# Patient Record
Sex: MALE | Race: WHITE | URBAN - METROPOLITAN AREA
[De-identification: names, ages, dates, MRNs, and addresses within clinical notes are randomized per-mention and may not be internally consistent; named-entity substitution may affect disease eponyms.]

---

## 2019-11-18 ENCOUNTER — OFFICE VISIT CONVERTED (OUTPATIENT)
Dept: FAMILY MEDICINE CLINIC | Facility: CLINIC | Age: 34
End: 2019-11-18
Attending: NURSE PRACTITIONER

## 2020-06-30 ENCOUNTER — OFFICE VISIT CONVERTED (OUTPATIENT)
Dept: FAMILY MEDICINE CLINIC | Facility: CLINIC | Age: 35
End: 2020-06-30
Attending: NURSE PRACTITIONER

## 2020-06-30 ENCOUNTER — CONVERSION ENCOUNTER (OUTPATIENT)
Dept: FAMILY MEDICINE CLINIC | Facility: CLINIC | Age: 35
End: 2020-06-30

## 2020-06-30 ENCOUNTER — HOSPITAL ENCOUNTER (OUTPATIENT)
Dept: FAMILY MEDICINE CLINIC | Facility: CLINIC | Age: 35
Discharge: HOME | End: 2020-06-30
Attending: NURSE PRACTITIONER

## 2020-07-01 LAB
ALBUMIN SERPL-MCNC: 4.3 G/DL (ref 3.5–5)
ALBUMIN/GLOB SERPL: 1.5 {RATIO} (ref 1.4–2.6)
ALP SERPL-CCNC: 73 U/L (ref 53–128)
ALT SERPL-CCNC: 11 U/L (ref 10–40)
AMYLASE SERPL-CCNC: 68 U/L (ref 30–110)
ANION GAP SERPL CALC-SCNC: 18 MMOL/L (ref 8–19)
AST SERPL-CCNC: 27 U/L (ref 15–50)
BASOPHILS # BLD AUTO: 0.03 10*3/UL (ref 0–0.2)
BASOPHILS NFR BLD AUTO: 0.4 % (ref 0–3)
BILIRUB SERPL-MCNC: 0.25 MG/DL (ref 0.2–1.3)
BUN SERPL-MCNC: 17 MG/DL (ref 5–25)
BUN/CREAT SERPL: 12 {RATIO} (ref 6–20)
CALCIUM SERPL-MCNC: 9.4 MG/DL (ref 8.7–10.4)
CHLORIDE SERPL-SCNC: 99 MMOL/L (ref 99–111)
CHOLEST SERPL-MCNC: 157 MG/DL (ref 107–200)
CHOLEST/HDLC SERPL: 5.4 {RATIO} (ref 3–6)
CONV ABS IMM GRAN: 0.03 10*3/UL (ref 0–0.2)
CONV CO2: 24 MMOL/L (ref 22–32)
CONV IMMATURE GRAN: 0.4 % (ref 0–1.8)
CONV TOTAL PROTEIN: 7.2 G/DL (ref 6.3–8.2)
CREAT UR-MCNC: 1.39 MG/DL (ref 0.7–1.2)
DEPRECATED RDW RBC AUTO: 41.6 FL (ref 35.1–43.9)
EOSINOPHIL # BLD AUTO: 0.5 10*3/UL (ref 0–0.7)
EOSINOPHIL # BLD AUTO: 6.3 % (ref 0–7)
ERYTHROCYTE [DISTWIDTH] IN BLOOD BY AUTOMATED COUNT: 13.3 % (ref 11.6–14.4)
GFR SERPLBLD BASED ON 1.73 SQ M-ARVRAT: >60 ML/MIN/{1.73_M2}
GLOBULIN UR ELPH-MCNC: 2.9 G/DL (ref 2–3.5)
GLUCOSE SERPL-MCNC: 84 MG/DL (ref 70–99)
HCT VFR BLD AUTO: 39.4 % (ref 42–52)
HDLC SERPL-MCNC: 29 MG/DL (ref 40–60)
HGB BLD-MCNC: 12.6 G/DL (ref 14–18)
LDLC SERPL CALC-MCNC: 103 MG/DL (ref 70–100)
LIPASE SERPL-CCNC: 25 U/L (ref 5–51)
LYMPHOCYTES # BLD AUTO: 2.68 10*3/UL (ref 1–5)
LYMPHOCYTES NFR BLD AUTO: 34 % (ref 20–45)
MCH RBC QN AUTO: 27.7 PG (ref 27–31)
MCHC RBC AUTO-ENTMCNC: 32 G/DL (ref 33–37)
MCV RBC AUTO: 86.6 FL (ref 80–96)
MONOCYTES # BLD AUTO: 0.54 10*3/UL (ref 0.2–1.2)
MONOCYTES NFR BLD AUTO: 6.9 % (ref 3–10)
NEUTROPHILS # BLD AUTO: 4.1 10*3/UL (ref 2–8)
NEUTROPHILS NFR BLD AUTO: 52 % (ref 30–85)
NRBC CBCN: 0 % (ref 0–0.7)
OSMOLALITY SERPL CALC.SUM OF ELEC: 283 MOSM/KG (ref 273–304)
PLATELET # BLD AUTO: 325 10*3/UL (ref 130–400)
PMV BLD AUTO: 10.6 FL (ref 9.4–12.4)
POTASSIUM SERPL-SCNC: 4.6 MMOL/L (ref 3.5–5.3)
RBC # BLD AUTO: 4.55 10*6/UL (ref 4.7–6.1)
SODIUM SERPL-SCNC: 136 MMOL/L (ref 135–147)
TRIGL SERPL-MCNC: 123 MG/DL (ref 40–150)
TSH SERPL-ACNC: 1.35 M[IU]/L (ref 0.27–4.2)
VLDLC SERPL-MCNC: 25 MG/DL (ref 5–37)
WBC # BLD AUTO: 7.88 10*3/UL (ref 4.8–10.8)

## 2020-09-08 ENCOUNTER — TELEMEDICINE CONVERTED (OUTPATIENT)
Dept: FAMILY MEDICINE CLINIC | Facility: CLINIC | Age: 35
End: 2020-09-08
Attending: NURSE PRACTITIONER

## 2020-09-09 ENCOUNTER — HOSPITAL ENCOUNTER (OUTPATIENT)
Dept: GENERAL RADIOLOGY | Facility: HOSPITAL | Age: 35
Discharge: HOME | End: 2020-09-09
Attending: NURSE PRACTITIONER

## 2020-09-09 LAB
ALBUMIN SERPL-MCNC: 4.1 G/DL (ref 3.5–5)
ALBUMIN/GLOB SERPL: 1.4 {RATIO} (ref 1.4–2.6)
ALP SERPL-CCNC: 76 U/L (ref 53–128)
ALT SERPL-CCNC: 12 U/L (ref 10–40)
ANION GAP SERPL CALC-SCNC: 16 MMOL/L (ref 8–19)
AST SERPL-CCNC: 21 U/L (ref 15–50)
BASOPHILS # BLD AUTO: 0.03 10*3/UL (ref 0–0.2)
BASOPHILS NFR BLD AUTO: 0.3 % (ref 0–3)
BILIRUB SERPL-MCNC: 0.22 MG/DL (ref 0.2–1.3)
BUN SERPL-MCNC: 16 MG/DL (ref 5–25)
BUN/CREAT SERPL: 13 {RATIO} (ref 6–20)
CALCIUM SERPL-MCNC: 9 MG/DL (ref 8.7–10.4)
CHLORIDE SERPL-SCNC: 101 MMOL/L (ref 99–111)
CONV ABS IMM GRAN: 0.02 10*3/UL (ref 0–0.2)
CONV CO2: 26 MMOL/L (ref 22–32)
CONV IMMATURE GRAN: 0.2 % (ref 0–1.8)
CONV TOTAL PROTEIN: 7.1 G/DL (ref 6.3–8.2)
CREAT UR-MCNC: 1.2 MG/DL (ref 0.7–1.2)
DEPRECATED RDW RBC AUTO: 43.9 FL (ref 35.1–43.9)
EOSINOPHIL # BLD AUTO: 0.51 10*3/UL (ref 0–0.7)
EOSINOPHIL # BLD AUTO: 5.5 % (ref 0–7)
ERYTHROCYTE [DISTWIDTH] IN BLOOD BY AUTOMATED COUNT: 13.5 % (ref 11.6–14.4)
FOLATE SERPL-MCNC: 12.2 NG/ML (ref 4.8–20)
GFR SERPLBLD BASED ON 1.73 SQ M-ARVRAT: >60 ML/MIN/{1.73_M2}
GLOBULIN UR ELPH-MCNC: 3 G/DL (ref 2–3.5)
GLUCOSE SERPL-MCNC: 75 MG/DL (ref 70–99)
HCT VFR BLD AUTO: 38.3 % (ref 42–52)
HGB BLD-MCNC: 11.9 G/DL (ref 14–18)
IRON SATN MFR SERPL: 10 % (ref 20–55)
IRON SERPL-MCNC: 36 UG/DL (ref 70–180)
LYMPHOCYTES # BLD AUTO: 2.37 10*3/UL (ref 1–5)
LYMPHOCYTES NFR BLD AUTO: 25.6 % (ref 20–45)
MCH RBC QN AUTO: 27.5 PG (ref 27–31)
MCHC RBC AUTO-ENTMCNC: 31.1 G/DL (ref 33–37)
MCV RBC AUTO: 88.5 FL (ref 80–96)
MONOCYTES # BLD AUTO: 0.72 10*3/UL (ref 0.2–1.2)
MONOCYTES NFR BLD AUTO: 7.8 % (ref 3–10)
NEUTROPHILS # BLD AUTO: 5.61 10*3/UL (ref 2–8)
NEUTROPHILS NFR BLD AUTO: 60.6 % (ref 30–85)
NRBC CBCN: 0 % (ref 0–0.7)
OSMOLALITY SERPL CALC.SUM OF ELEC: 288 MOSM/KG (ref 273–304)
PLATELET # BLD AUTO: 286 10*3/UL (ref 130–400)
PMV BLD AUTO: 10.7 FL (ref 9.4–12.4)
POTASSIUM SERPL-SCNC: 4.2 MMOL/L (ref 3.5–5.3)
RBC # BLD AUTO: 4.33 10*6/UL (ref 4.7–6.1)
SODIUM SERPL-SCNC: 139 MMOL/L (ref 135–147)
TIBC SERPL-MCNC: 365 UG/DL (ref 245–450)
TRANSFERRIN SERPL-MCNC: 255 MG/DL (ref 215–365)
VIT B12 SERPL-MCNC: 697 PG/ML (ref 211–911)
WBC # BLD AUTO: 9.26 10*3/UL (ref 4.8–10.8)

## 2020-09-10 ENCOUNTER — HOSPITAL ENCOUNTER (OUTPATIENT)
Dept: FAMILY MEDICINE CLINIC | Facility: CLINIC | Age: 35
Discharge: HOME | End: 2020-09-10
Attending: NURSE PRACTITIONER

## 2020-09-10 ENCOUNTER — OFFICE VISIT CONVERTED (OUTPATIENT)
Dept: FAMILY MEDICINE CLINIC | Facility: CLINIC | Age: 35
End: 2020-09-10
Attending: NURSE PRACTITIONER

## 2020-09-10 LAB
APPEARANCE UR: CLEAR
BILIRUB UR QL: NEGATIVE
COLOR UR: YELLOW
CONV BACTERIA: NEGATIVE
CONV COLLECTION SOURCE (UA): ABNORMAL
CONV UROBILINOGEN IN URINE BY AUTOMATED TEST STRIP: 1 {EHRLICHU}/DL (ref 0.1–1)
GLUCOSE UR QL: NEGATIVE MG/DL
HGB UR QL STRIP: NEGATIVE
KETONES UR QL STRIP: NEGATIVE MG/DL
LEUKOCYTE ESTERASE UR QL STRIP: NEGATIVE
NITRITE UR QL STRIP: NEGATIVE
PH UR STRIP.AUTO: 5.5 [PH] (ref 5–8)
PROT UR QL: 30 MG/DL
RBC #/AREA URNS HPF: ABNORMAL /[HPF]
SP GR UR: 1.02 (ref 1–1.03)
WBC #/AREA URNS HPF: ABNORMAL /[HPF]

## 2020-09-12 LAB — BACTERIA UR CULT: NORMAL

## 2020-09-15 ENCOUNTER — OFFICE VISIT CONVERTED (OUTPATIENT)
Dept: FAMILY MEDICINE CLINIC | Facility: CLINIC | Age: 35
End: 2020-09-15
Attending: NURSE PRACTITIONER

## 2020-09-15 LAB
C TRACH RRNA CVX QL NAA+PROBE: NEGATIVE
N GONORRHOEA DNA SPEC QL NAA+PROBE: NEGATIVE

## 2021-05-13 NOTE — PROGRESS NOTES
"   Progress Note      Patient Name: Joaquim Ugarte   Patient ID: [id number]   Sex: Male   YOB: 1985        Visit Date: June 30, 2020    Provider: RONALD Lugo   Location: [facility]   Location Address: [address] [place], [place] [address]RY[place] Mack[place]  [id number]   Location Phone: [phone]          Chief Complaint     follow up refills         History Of Present Illness  Joaquim Ugarte is a 35 year old male who presents for evaluation and treatment of:      pt asking for creon and zofran.    hx of pancreatitis with multiple ERCPs for bile duct obstruction. He uses Creon. He was admitted to the hospital in Jan for elevated LFTs but they did not say it was actually pancreatitis. Last pancreatitis was in 2017, these started in 2009. He is disabled because of it. He sees gastro at [facility], but hasn't seen them in a few years. He says he was having such pain that he started using street drugs to cope.  He's been doing recently.    GERD, would like to try protonix. He has nausea intermittently, wants zofran refilled.     drug addiction, he says he is on and off methadone. He is on 80mg of methadone now. He admits to \"some setbacks.\"    hx of alcoholism, he says he has had some set backs    He wakes at night gasping for air. He doesn't snore. His mom has sleep apnea. He does not want to do a test.    smokes 1ppd    sober from alcohol since 2009  sober from drugs for 4 months, \"but I've had an occ. set back\"       Past Medical History  Disease Name Date Onset Notes   Allergic rhinitis --  --    Anxiety --  --    Chemical dependency --  --    Chronic pancreatitis --  --    Colitis --  --    Congestive Heart Failure --  --    COPD (chronic obstructive pulmonary disease) --  --    Depression (emotion) --  --    Diverticulitis --  --    Emphysema --  --    IBS (irritable bowel syndrome) --  --    Night sweats --  --    Reflux Disease --  --    Sinus trouble --  --          Past Surgical History  Procedure " Name Date Notes   Colonoscopy in adult 2011 --          Medication List  Name Date Started Instructions   Claritin 10 mg oral tablet 11/18/2019 take 1 tablet (10 mg) by oral route once daily   Creon oral  take 1 by oral route daily   Flonase Allergy Relief 50 mcg/actuation nasal spray,suspension 11/18/2019 inhale 2 sprays (100 mcg) in each nostril by intranasal route once daily as needed   gabapentin 600 mg oral tablet  take 1 tablet (600 mg) by oral route 3 times per day   hydroxyzine HCl 25 mg oral tablet  --    Imitrex 50 mg oral tablet  --    methadone oral  take 1 by oral route daily   nortriptyline 10 mg oral capsule 11/18/2019 take 1 capsule (10 mg) by oral route once daily at bedtime   oxycodone 10 mg oral tablet  --    promethazine 25 mg oral tablet  --    Zofran 8 mg oral tablet 11/18/2019 take 1 tablet by oral route 2 times a day as needed         Allergy List  Allergen Name Date Reaction Notes   PENICILLINS --  --  --        Allergies Reconciled  Family Medical History  Disease Name Relative/Age Notes   Cervical Neoplasm, Malignant  --    Stroke  --    Heart Disease  --    Cancer, Unspecified  tonsil cancer- father brain cancer- GM   Thyroid disorder Sister/   thyroid cancer   Diabetes  --    Kidney Cancer  --          Social History  Finding Status Start/Stop Quantity Notes   Tobacco Current every day --/-- 1ppd --          Immunizations  NameDate Admin Mfg Trade Name Lot Number Route Inj VIS Given VIS Publication   [id number]/18/2019 [facility] Fluzone Quadrivalent [id number] IM RD 11/18/2019    Comments: Pt tolerated well, left office in stable condition         Review of Systems  · Constitutional  o Admits  o : fatigue  · HENT  o Admits  o : nasal congestion  o Denies  o : headaches, sinus pain  · Cardiovascular  o Denies  o : lower extremity edema, chest pressure, palpitations  · Respiratory  o Denies  o : shortness of breath, wheezing, cough, dyspnea on exertion  · Gastrointestinal  o Admits  o : reflux,  nausea, diarrhea  o Denies  o : vomiting, constipation, abdominal pain  · Musculoskeletal  o Denies  o : joint pain  · Psychiatric  o Admits  o : anxiety, depression  o Denies  o : suicidal ideation, homicidal ideation      Vitals  Date Time BP Position Site L\R Cuff Size HR RR TEMP (F) WT  HT  BMI kg/m2 BSA m2 O2 Sat HC       06/30/2020 11:07 /77 Sitting    85 - R 18 98 153lbs 1oz 6'   20.76 1.88 97 %          Physical Examination  · Constitutional  o Appearance  o : well developed,thin appearing, no acute distress  · Neck  o Inspection/Palpation  o : normal appearance, no masses or tenderness, trachea midline  o Thyroid  o : gland size normal, nontender, no nodules or masses present on palpation  · Respiratory  o Respiratory Effort  o : breathing unlabored  o Inspection of Chest  o : chest rise symmetric bilaterally  o Auscultation of Lungs  o : clear to auscultation bilaterally throughout inspiration and expiration  · Cardiovascular  o Heart  o :   § Auscultation of Heart  § : regular rate and rhythm, no murmurs, gallops or rubs  o Peripheral Vascular System  o :   § Extremities  § : no edema  · Lymphatic  o Neck  o : no cervical lymphadenopathy, no supraclavicular lymphadenopathy  · Psychiatric  o Mood and Affect  o : mood normal, inappropriate      he slurs his words, is wearing sunglasses in the exam room, smells strongly of tobacco smoke and cologne.               Assessment  · Allergic rhinitis due to allergen     477.9/J30.9  · GERD (gastroesophageal reflux disease)     530.81/K21.9  · Nicotine dependence     305.1/F17.200  · Screening for lipid disorders     V77.91/Z13.220  · Methadone maintenance therapy patient     304.00/F11.20  · History of drug dependence/abuse     304.63/F19.21  · Chronic pancreatitis     577.1/K86.1  · History of colitis     V12.79/Z87.19      Plan  · Orders  o Physical, Primary Care Panel (CBC, CMP, Lipid, TSH) [facility] ([id number], 62045, 26047, 04131) - - 06/30/2020  o ACO-39:  Current medications updated and reviewed () - - 06/30/2020  o ACO-14: Influenza immunization administered or previously received () - - 06/30/2020  o ALIDA Report (KASPR) - - 06/30/2020  o Amylase/Lipase Profile (ALPN) - - 06/30/2020  · Medications  o Protonix 40 mg oral tablet,delayed release (DR/EC)   SIG: take 1 tablet (40 mg) by oral route once daily   DISP: (30) tablets with 5 refills  Prescribed on 06/30/2020     o Creon 36,000-114,000- 180,000 unit oral capsule,delayed release(DR/EC)   SIG: take 2 capsules by oral route 3 times per day with meals and 1 capsule with each snack swallowing whole. Do not crush, chew and/or divide.   DISP: (300) capsules with 0 refills  Prescribed on 06/30/2020     o Zofran 8 mg oral tablet   SIG: take 1 tablet by oral route 2 times a day as needed   DISP: (20) tablets with 1 refills  Refilled on 06/30/2020     o Medications have been Reconciled  o Transition of Care or Provider Policy  · Instructions  o Maintain a healthy weight. Avoid tight fitting clothes. Avoid fried, fatty foods, tomato sauce, chocolate, mint, garlic, onion, alcohol. caffeine. Eat smaller meals, dont lie down after a meal, dont smoke. Elevate the head of your bed 6-9 inches.  o *Form of nicotine being used: cigarettes  o Take all medications as prescribed/directed.  o Patient was educated/instructed on their diagnosis, treatment and medications prior to discharge from the clinic today.  o Patient instructed to seek medical attention urgently for new or worsening symptoms.  o Call the office with any concerns or questions.  o Minutes spent with patient including greater than 50% in Education/Counseling/Care Coordination.  o Time spent with the patient was minutes, more than 50% face to face.  o Risks, benefits, and alternatives were discussed with the patient. The patient is aware of risks associated with:  o Chronic conditions reviewed and taken into consideration for today's treatment plan.  o i  agreed to fill his creon for one refill, but insisted he f/u with gastro. I called the pharmacy and he's not had this filled since Nov. 2018. He agrees that he will call them.   · Disposition  o Call or Return if symptoms worsen or persist.  o F/u appt in 3 months            Electronically Signed by: RONALD Lugo -Author on June 30, 2020 12:02:35 PM

## 2021-05-13 NOTE — PROGRESS NOTES
Quick Note      Patient Name: Joaquim Ugarte   Patient ID: [id number]   Sex: Male   YOB: 1985        Visit Date: September 8, 2020    Provider: RONALD Lugo   Location: [facility]   Location Address: [address] [place] [address] RYNE Rosales  [id number]   Location Phone: [phone]          History Of Present Illness  TELEHEALTH TELEPHONE VISIT  Joaquim Ugarte is a 35 year old male who is presenting for evaluation via telehealth telephone visit. Verbal consent obtained before beginning visit. Patient alone on call.   Provider spent 11 minutes with patient during telehealth visit.   The following staff were present during this visit: AT/CMA   Past Medical History/Overview of Patient Symptoms  Joaquim Ugarte is a 35 year old male who presents for evaluation and treatment of:      Right Groin area lymph nodes swollen- golf ball size X5 days   Right inner elbow is swollen- golf ball size X1 week    he is willing to come in for an appt. He feels ok, denies fever, they are tender.     last visit showed anemia, he did not return for the f/u tests.     He is willing to have labs done prior to his appt.    Last creatinine was elevated.           Assessment  · Anemia     285.9/D64.9  · Swollen lymph nodes     785.6/R59.9  · Elevated laboratory test result     796.4/R89.9      Plan  · Orders  o B12 Folate levels (B12FO) - 285.9/D64.9 - 09/08/2020  o CBC with Auto Diff [facility] ([id number]) - 785.6/R59.9 - 09/08/2020  o CMP [facility] ([id number]) - 796.4/R89.9 - 09/08/2020  o ACO-39: Current medications updated and reviewed () - - 09/08/2020  o ACO-14: Influenza immunization administered or previously received () - - 09/08/2020  o Physician Telephone Evaluation, 11-20 minutes ([id number]) - - 09/08/2020  o Urinalysis with Reflex Microscopy if abnormal ([facility]) ([id number]) - 785.6/R59.9 - 09/08/2020  o Urine Culture [facility] ([id number]) - 785.6/R59.9 - 09/08/2020  o GC/Chlamydia by PCR (Urine or Vaginal Swab)  [facility] ([id number]) - 785.6/R59.9 - 09/08/2020  · Medications  o Medications have been Reconciled  o Transition of Care or Provider Policy  · Instructions  o Plan Of Care: he is going to have labs prior to the appt, he may need a hematology consult.   o Chronic conditions reviewed and taken into consideration for today's treatment plan.  o Call the office with any concerns or questions.  o Minutes spent with patient including greater than 50% in Education/Counseling/Care Coordination.  o Time spent with the patient was minutes, more than 50% face to face.  o he will come thursday for a same day appt so I can eval face to face  · Disposition  o Call or Return if symptoms worsen or persist.            Electronically Signed by: RONALD Lugo -Author on September 8, 2020 04:01:04 PM

## 2021-05-13 NOTE — PROGRESS NOTES
Progress Note      Patient Name: Joaquim Ugarte   Patient ID: [id number]   Sex: Male   YOB: 1985        Visit Date: September 15, 2020    Provider: RONALD Lugo   Location: [facility]   Location Address: [address] [place] [address] R[place]NE Rosales  [id number]   Location Phone: [phone]          Chief Complaint  · Rash on bi lat arms, face, head, buttocks  · Left ear pain      History Of Present Illness  Past Medical History/Overview of Patient Symptoms  Joaquim Uagrte is a 35 year old male who presents for evaluation and treatment of:      f/u on swollen lymph nodes, epitrochlear and right groin. WBC was normal, he is anemic. He needs labs today to check for mono, HIV, RPR. He reports that the swelling is worse at the elbow and now he has one under the left jaw and a few in his scalp that are new. He is on keflex currently.     Hx of IV drug use, he says he is not using any now. He reports he had a HIV test last year at the methadone clinic that was negative. He is still using methadone.  He is wearing glasses during the visit again today.  He failed UDS at last visit.            Past Medical History  Disease Name Date Onset Notes   Allergic rhinitis --  --    Anxiety --  --    Chemical dependency --  --    Chronic pancreatitis --  --    Colitis --  --    Congestive Heart Failure --  --    COPD (chronic obstructive pulmonary disease) --  --    Depression (emotion) --  --    Diverticulitis --  --    Emphysema --  --    IBS (irritable bowel syndrome) --  --    Night sweats --  --    Reflux Disease --  --    Sinus trouble --  --          Past Surgical History  Procedure Name Date Notes   Colonoscopy in adult 2011 --          Medication List  Name Date Started Instructions   Claritin 10 mg oral tablet 11/18/2019 take 1 tablet (10 mg) by oral route once daily   Creon oral  take 1 by oral route daily   Creon 36,000-114,000- 180,000 unit oral capsule,delayed release(/EC)  09/15/2020 take 2 capsules by oral route 3 times per day with meals and 1 capsule with each snack swallowing whole. Do not crush, chew and/or divide.   Flonase Allergy Relief 50 mcg/actuation nasal spray,suspension 11/18/2019 inhale 2 sprays (100 mcg) in each nostril by intranasal route once daily as needed   hydroxyzine HCl 25 mg oral tablet  --    Iron (ferrous sulfate) 325 mg (65 mg iron) oral tablet 09/10/2020 take 1 tablet (325 mg) by oral route once daily   Keflex 500 mg oral capsule 09/10/2020 take 1 capsule by oral route 3 times a day for 10 days   methadone oral  take 1 by oral route daily   Protonix 40 mg oral tablet,delayed release (DR/EC) 06/30/2020 take 1 tablet (40 mg) by oral route once daily   Zofran 8 mg oral tablet 06/30/2020 take 1 tablet by oral route 2 times a day as needed         Allergy List  Allergen Name Date Reaction Notes   Latex --  --  --    PENICILLINS --  --  --        Allergies Reconciled  Family Medical History  Disease Name Relative/Age Notes   Cervical Neoplasm, Malignant  --    Stroke  --    Heart Disease  --    Cancer, Unspecified  tonsil cancer- father brain cancer- GM   Thyroid disorder Sister/   thyroid cancer   Diabetes  --    Kidney Cancer  --          Social History  Finding Status Start/Stop Quantity Notes   Tobacco Current every day --/-- 1ppd --          Immunizations  NameDate Admin Mfg Trade Name Lot Number Route Inj VIS Given VIS Publication   [id number]/18/2019 [facility] Fluzone Quadrivalent [id number] IM RD 11/18/2019    Comments: Pt tolerated well, left office in stable condition         Review of Systems  · Constitutional  o Admits  o : fatigue  o Denies  o : fever, night sweats  · HENT  o Denies  o : vertigo, lightheadedness, recent head injury  · Cardiovascular  o Denies  o : chest pain, irregular heart beats, rapid heart rate, dyspnea on exertion, lower extremity edema  · Respiratory  o Denies  o : shortness of breath, wheezing, cough, productive  "cough  · Gastrointestinal  o Denies  o : nausea, vomiting, diarrhea, dysphagia, heartburn  · Genitourinary  o Denies  o : dysuria, urinary retention  · Neurologic  o Denies  o : altered mental status, seizures  · Psychiatric  o Denies  o : anxiety, depression  · Heme-Lymph  o Admits  o : lymph node enlargement or tenderness  o Denies  o : easy bleeding, easy bruising, petechiae  · Allergic-Immunologic  o Denies  o : frequent illnesses      Vitals  Date Time BP Position Site L\R Cuff Size HR RR TEMP (F) WT  HT  BMI kg/m2 BSA m2 O2 Sat HC       09/15/2020 03:31 /80 Sitting    100 - R 16 98.9 153lbs 3oz 6'   20.78 1.88 97 %          Physical Examination  · Constitutional  o Appearance  o : well developed, well-nourished, no acute distress  · Respiratory  o Respiratory Effort  o : breathing unlabored  o Auscultation of Lungs  o : clear to auscultation bilaterally throughout inspiration and expiration  · Lymphatic  o Neck  o : no cervical lymphadenopathy, no supraclavicular lymphadenopathy  o Axilla  o : no lymphadenopathy  o Groin  o : pt did not let me do the exam  o Epitrochlear Nodes  o : right epitrochlear lymphnode swollen kaela. 1.5 cm in diameter, tender  o Supraclavicular Nodes  o : none evident  o Additional Nodes  o : left submandibular lymph node tender  · Psychiatric  o Mood and Affect  o : at first, his mood was normal, but when I talked to him about changing abx he became mad and beligerant.      when discussing the plan, he gets beligerant that he just wants a steroid and if I can't see that, he will go find a doctor who can treat him because he doens't usually see anyone but doctors because \"Nurse Practitioners dont have a brain.\"    He stated he was going to the ER where they would treat him and he stormed out with out having labs done today or any further treatment.               Assessment  · Anemia     285.9/D64.9  · Epitrochlear lymphadenopathy     785.6/R59.0  · Inguinal " lymphadenopathy     785.6/R59.0  · Iron deficiency anemia     280.9/D50.9  · History of recreational drug use     304.90/Z87.898      Plan  · Orders  o ACO-39: Current medications updated and reviewed () - - 09/15/2020  · Medications  o Medications have been Reconciled  o Transition of Care or Provider Policy  · Instructions  o Chronic conditions reviewed and taken into consideration for today's treatment plan.  o Call the office with any concerns or questions.  o Minutes spent with patient including greater than 50% in Education/Counseling/Care Coordination.  o Time spent with the patient was minutes, more than 50% face to face.  o I am concerned on multiple levels for this pt and I explained this to him before he became agitated and left. I recommended an u/s on the areas of concern and he refuses. He refuses changing antibiotics. I also offered to give a round of steroids with the antibiotics, which is what he was requesting, and he does not answer the offer. He left very irrate. He would not speak with my . He refused any labs today  · Disposition  o Call or Return if symptoms worsen or persist.            Electronically Signed by: RONALD Lugo -Author on September 15, 2020 05:14:01 PM

## 2021-05-13 NOTE — PROGRESS NOTES
Progress Note      Patient Name: Joaquim Ugarte   Patient ID: [id number]   Sex: Male   YOB: 1985        Visit Date: September 10, 2020    Provider: RONALD Lugo   Location: [facility]   Location Address: [address] [place] [address] RYNE Rosales  [id number]   Location Phone: [phone]          Chief Complaint  · Discuss labs  · Medication refill  · Gastro referral       History Of Present Illness  Past Medical History/Overview of Patient Symptoms  Joaquim Ugarte is a 35 year old male who presents for evaluation and treatment of:      swollen, tender lymph nodes, right inner forearm near the elbow and right groin.  They have been inflamed for a week or more. He describes them as the size of a golf ball. He has labs drawn yesterday in preparation for the visit today.     He has been fatigued and drinking lots of energy drinks. Denies fever. No urinary issues, no trauma, no cat scratches.     anemia, he did not return for the f/u tests but had labs drawn yesterday. His iron was low and hgb was 11.9.     Last creatinine was elevated but normal on labs today.     Hx of IV drug use, he says he is not using any now. He reports he had a HIV test last year at the methadone clinic that was negative. He is still using methadone.  we are waiting on the HIV test from yesterday.     hx of chronic pancreatitis, he needs refills on the Creon. He wants a referral to get back in with his gastro.  He hasn't had any issues recently       Past Medical History  Disease Name Date Onset Notes   Allergic rhinitis --  --    Anxiety --  --    Chemical dependency --  --    Chronic pancreatitis --  --    Colitis --  --    Congestive Heart Failure --  --    COPD (chronic obstructive pulmonary disease) --  --    Depression (emotion) --  --    Diverticulitis --  --    Emphysema --  --    IBS (irritable bowel syndrome) --  --    Night sweats --  --    Reflux Disease --  --    Sinus trouble --  --           Past Surgical History  Procedure Name Date Notes   Colonoscopy in adult 2011 --          Medication List  Name Date Started Instructions   Claritin 10 mg oral tablet 11/18/2019 take 1 tablet (10 mg) by oral route once daily   Creon oral  take 1 by oral route daily   Creon 36,000-114,000- 180,000 unit oral capsule,delayed release(DR/EC) 09/15/2020 take 2 capsules by oral route 3 times per day with meals and 1 capsule with each snack swallowing whole. Do not crush, chew and/or divide.   Flonase Allergy Relief 50 mcg/actuation nasal spray,suspension 11/18/2019 inhale 2 sprays (100 mcg) in each nostril by intranasal route once daily as needed   hydroxyzine HCl 25 mg oral tablet  --    Iron (ferrous sulfate) 325 mg (65 mg iron) oral tablet 09/10/2020 take 1 tablet (325 mg) by oral route once daily   Keflex 500 mg oral capsule 09/10/2020 take 1 capsule by oral route 3 times a day for 10 days   methadone oral  take 1 by oral route daily   Protonix 40 mg oral tablet,delayed release (DR/EC) 06/30/2020 take 1 tablet (40 mg) by oral route once daily   Zofran 8 mg oral tablet 06/30/2020 take 1 tablet by oral route 2 times a day as needed         Allergy List  Allergen Name Date Reaction Notes   Latex --  --  --    PENICILLINS --  --  --          Family Medical History  Disease Name Relative/Age Notes   Cervical Neoplasm, Malignant  --    Stroke  --    Heart Disease  --    Cancer, Unspecified  tonsil cancer- father brain cancer- GM   Thyroid disorder Sister/   thyroid cancer   Diabetes  --    Kidney Cancer  --          Social History  Finding Status Start/Stop Quantity Notes   Tobacco Current every day --/-- 1ppd --          Immunizations  NameDate Admin Mfg Trade Name Lot Number Route Inj VIS Given VIS Publication   Tmczwisvy03/18/2019 [facility] Fluzone Quadrivalent [id number] IM RD 11/18/2019    Comments: Pt tolerated well, left office in stable condition         Review of Systems  · Constitutional  o Admits  o :  fatigue  o Denies  o : fever, night sweats  · HENT  o Denies  o : vertigo, lightheadedness, recent head injury  · Cardiovascular  o Denies  o : chest pain, irregular heart beats, rapid heart rate, dyspnea on exertion, lower extremity edema  · Respiratory  o Denies  o : shortness of breath, wheezing, cough, productive cough  · Gastrointestinal  o Denies  o : nausea, vomiting, diarrhea, dysphagia, heartburn  · Psychiatric  o Denies  o : anxiety, depression  · Heme-Lymph  o Admits  o : lymph node enlargement or tenderness  o Denies  o : easy bleeding, easy bruising, petechiae      Vitals  Date Time BP Position Site L\R Cuff Size HR RR TEMP (F) WT  HT  BMI kg/m2 BSA m2 O2 Sat        09/10/2020 11:21 /77 Sitting    75 - R 16 98.5 149lbs 5oz 6'   20.25 1.85 97 %          Physical Examination  · Constitutional  o Appearance  o : well developed, well-nourished, no acute distress  · Head and Face  o Head  o : normocephalic, atraumatic  · Respiratory  o Respiratory Effort  o : breathing unlabored  o Auscultation of Lungs  o : clear to auscultation bilaterally throughout inspiration and expiration  · Cardiovascular  o Heart  o : regular rate and rhythm  o Peripheral Vascular System  o : no peripheral edema  · Lymphatic  o Neck  o : no cervical lymphadenopathy, no supraclavicular lymphadenopathy  o Axilla  o : no lymphadenopathy  o Groin  o : right inguinal lymph node swollen and tender appr. 1cm in diameter  o Epitrochlear Nodes  o : right epitrochlear lymphnode swollen kaela. 1 cm in diameter, tender  o Supraclavicular Nodes  o : none evident  · Psychiatric  o Mood and Affect  o : mood normal, affect appropriate     he is wearing sunglasses during most of the exam.           Results  · In-Office Procedures  o Lab procedure  § IOP - Urine Drug Screen In-House [facility] ([address]10)   § Amphetamines Ur Ql: Negative   § Barbiturates Ur Ql: Negative   § Buprenorphine+Nor Ur Ql Scn: Negative   § Benzodiaz Ur Ql: Negative   § Cocaine Ur  Ql: Negative   § Methadone Ur Ql: Positive   § Methamphet Ur Ql: Negative   § MDMA Ur Ql Scn: Negative   § Opiates Ur Ql: Positive   § Oxycodone Ur Ql: Negative   § PCP Ur Ql: Negative   § THC Ur Ql: Positive   § Temp in Range?: Within/Acceptable   § Control Seen?: Yes   § IOP - Urinalysis without Microscopy (Clinitek) [facility] ([id number])   § Color Ur: Yellow   § Clarity Ur: Clear   § Glucose Ur Ql Strip: Negative   § Bilirub Ur Ql Strip: Negative   § Ketones Ur Ql Strip: Negative   § Sp Gr Ur Qn: greater than 1.030   § Hgb Ur Ql Strip: Trace-Intact   § pH Ur-LsCnc: 6.0   § Prot Ur Ql Strip: 30 mg/dL   § Urobilinogen Ur Strip-mCnc: 0.2 E.U./dL   § Nitrite Ur Ql Strip: Negative   § WBC Est Ur Ql Strip: Negative       Assessment  · Anemia     285.9/D64.9  · Epitrochlear lymphadenopathy     785.6/R59.0  · Inguinal lymphadenopathy     785.6/R59.0  · Iron deficiency anemia     280.9/D50.9  · History of recreational drug use     304.90/Z87.898  · Chronic pancreatitis     577.1/K86.1  · Positive urine drug screen     796.0/R82.5      Plan  · Orders  o CBC with Auto Diff [facility] ([id number]) - - 10/07/2020   [facility]  o ACO-39: Current medications updated and reviewed () - - 09/10/2020  o ACO-14: Influenza immunization was not administered for reasons documented () - - 09/15/2020  o Gastroenterology Consultation (GASTR) - 577.1/K86.1, 285.9/D64.9 - 09/10/2020   pt est with dr brianne baron. needs updated referral and appt please.   o Iron Profile (Iron 17349 TIBC 33478 and Transferrin 33260) (IRONP) - - 10/07/2020  o EBV antibody panel (29942, 47694, 83542) - - 10/07/2020  o HIV Screen by EIA [facility] (60721, ) - - 09/15/2020  o RPR Treponema pallidum antibody [facility] (22844) - - 09/15/2020  · Medications  o Medications have been Reconciled  o Transition of Care or Provider Policy  · Instructions  o Chronic conditions reviewed and taken into consideration for today's treatment plan.  o Call the office with any  concerns or questions.  o Minutes spent with patient including greater than 50% in Education/Counseling/Care Coordination.  o Time spent with the patient was minutes, more than 50% face to face.  o I called in refills on the creon and the keflex to the pharmacy. Will await urine tests for std and HIV test. I am suspicious for lymphoma or HIV. will await those results before referring to hematology/oncology for futher work up   o referral to gastro for the f/u on pancreatitis  o since he was not requesting controlled meds today, I did not discuss with him the positive UDS.   · Disposition  o Call or Return if symptoms worsen or persist.  o F/U appt in 1 month            Electronically Signed by: RONALD Lugo -Author on September 15, 2020 03:42:04 PM   45

## 2021-05-14 VITALS
BODY MASS INDEX: 20.22 KG/M2 | DIASTOLIC BLOOD PRESSURE: 77 MMHG | OXYGEN SATURATION: 97 % | WEIGHT: 149.31 LBS | HEART RATE: 75 BPM | RESPIRATION RATE: 16 BRPM | TEMPERATURE: 98.5 F | SYSTOLIC BLOOD PRESSURE: 128 MMHG | HEIGHT: 72 IN

## 2021-05-14 VITALS
HEART RATE: 100 BPM | WEIGHT: 153.19 LBS | RESPIRATION RATE: 16 BRPM | SYSTOLIC BLOOD PRESSURE: 129 MMHG | HEIGHT: 72 IN | DIASTOLIC BLOOD PRESSURE: 80 MMHG | TEMPERATURE: 98.9 F | OXYGEN SATURATION: 97 % | BODY MASS INDEX: 20.75 KG/M2

## 2021-05-15 VITALS
BODY MASS INDEX: 20.73 KG/M2 | TEMPERATURE: 98 F | WEIGHT: 153.06 LBS | DIASTOLIC BLOOD PRESSURE: 77 MMHG | OXYGEN SATURATION: 97 % | RESPIRATION RATE: 18 BRPM | SYSTOLIC BLOOD PRESSURE: 127 MMHG | HEART RATE: 85 BPM | HEIGHT: 72 IN

## 2021-05-15 VITALS
SYSTOLIC BLOOD PRESSURE: 142 MMHG | WEIGHT: 143.19 LBS | HEART RATE: 85 BPM | OXYGEN SATURATION: 99 % | HEIGHT: 72 IN | DIASTOLIC BLOOD PRESSURE: 87 MMHG | BODY MASS INDEX: 19.39 KG/M2 | RESPIRATION RATE: 16 BRPM

## 2023-05-21 ENCOUNTER — HOSPITAL ENCOUNTER (EMERGENCY)
Facility: HOSPITAL | Age: 38
Discharge: HOME | End: 2023-05-21
Attending: EMERGENCY MEDICINE
Payer: MEDICAID

## 2023-05-21 VITALS
WEIGHT: 150 LBS | OXYGEN SATURATION: 100 % | RESPIRATION RATE: 18 BRPM | HEART RATE: 86 BPM | BODY MASS INDEX: 20.32 KG/M2 | HEIGHT: 72 IN | DIASTOLIC BLOOD PRESSURE: 75 MMHG | TEMPERATURE: 98.7 F | SYSTOLIC BLOOD PRESSURE: 126 MMHG

## 2023-05-21 DIAGNOSIS — R22.0 RIGHT FACIAL SWELLING: ICD-10-CM

## 2023-05-21 DIAGNOSIS — K04.7 DENTAL ABSCESS: Primary | ICD-10-CM

## 2023-05-21 DIAGNOSIS — K08.89 PAIN, DENTAL: ICD-10-CM

## 2023-05-21 PROCEDURE — 25010000002 KETOROLAC TROMETHAMINE PER 15 MG: Performed by: NURSE PRACTITIONER

## 2023-05-21 PROCEDURE — 96372 THER/PROPH/DIAG INJ SC/IM: CPT

## 2023-05-21 PROCEDURE — 99283 EMERGENCY DEPT VISIT LOW MDM: CPT

## 2023-05-21 RX ORDER — OXYCODONE AND ACETAMINOPHEN 10; 325 MG/1; MG/1
TABLET ORAL EVERY 6 HOURS SCHEDULED
COMMUNITY

## 2023-05-21 RX ORDER — PANCRELIPASE 36000; 180000; 114000 [USP'U]/1; [USP'U]/1; [USP'U]/1
CAPSULE, DELAYED RELEASE PELLETS ORAL EVERY 6 HOURS SCHEDULED
COMMUNITY

## 2023-05-21 RX ORDER — LIDOCAINE HYDROCHLORIDE 20 MG/ML
10 SOLUTION OROPHARYNGEAL
Status: DISCONTINUED | OUTPATIENT
Start: 2023-05-21 | End: 2023-05-21 | Stop reason: HOSPADM

## 2023-05-21 RX ORDER — ALBUTEROL SULFATE 90 UG/1
AEROSOL, METERED RESPIRATORY (INHALATION)
COMMUNITY

## 2023-05-21 RX ORDER — SUMATRIPTAN 50 MG/1
TABLET, FILM COATED ORAL EVERY 24 HOURS
COMMUNITY

## 2023-05-21 RX ORDER — KETOROLAC TROMETHAMINE 30 MG/ML
30 INJECTION, SOLUTION INTRAMUSCULAR; INTRAVENOUS ONCE
Status: COMPLETED | OUTPATIENT
Start: 2023-05-21 | End: 2023-05-21

## 2023-05-21 RX ORDER — GABAPENTIN 600 MG/1
TABLET ORAL EVERY 8 HOURS SCHEDULED
COMMUNITY

## 2023-05-21 RX ORDER — FLUTICASONE PROPIONATE 50 MCG
SPRAY, SUSPENSION (ML) NASAL EVERY 24 HOURS
COMMUNITY

## 2023-05-21 RX ORDER — ACETAMINOPHEN 160 MG/5ML
650 SOLUTION ORAL ONCE
Status: COMPLETED | OUTPATIENT
Start: 2023-05-21 | End: 2023-05-21

## 2023-05-21 RX ADMIN — KETOROLAC TROMETHAMINE 30 MG: 30 INJECTION, SOLUTION INTRAMUSCULAR; INTRAVENOUS at 17:55

## 2023-05-21 RX ADMIN — ACETAMINOPHEN 650 MG: 325 SUSPENSION ORAL at 17:57

## 2023-05-21 RX ADMIN — TOPICAL ANESTHETIC 1 SPRAY: 200 SPRAY DENTAL; PERIODONTAL at 17:57

## 2023-05-21 RX ADMIN — LIDOCAINE HYDROCHLORIDE 10 ML: 20 SOLUTION ORAL; TOPICAL at 17:57

## 2023-05-21 NOTE — ED PROVIDER NOTES
Time: 5:41 PM EDT  Date of encounter:  5/21/2023  Independent Historian/Clinical History and Information was obtained by:   Patient  Chief Complaint: dental pain and facial swelling    History is limited by: N/A    History of Present Illness:  Patient is a 38 y.o. year old male who presents to the emergency department for evaluation of dental pain in his right lower teeth which is causing swelling of the right side of his face.  He says the pain has been ongoing for several days however he just noticed the swelling upon waking this morning.  HPI    Patient Care Team  Primary Care Provider: Provider, No Known    Past Medical History:     Allergies   Allergen Reactions   • Bupropion Anaphylaxis   • Latex Itching   • Penicillins Unknown - High Severity     History reviewed. No pertinent past medical history.  History reviewed. No pertinent surgical history.  History reviewed. No pertinent family history.    Home Medications:  Prior to Admission medications    Medication Sig Start Date End Date Taking? Authorizing Provider   albuterol sulfate HFA (Proventil HFA) 108 (90 Base) MCG/ACT inhaler Every 4 (Four) Hours.    ProviderViral MD   fluticasone (Flonase Allergy Relief) 50 MCG/ACT nasal spray Daily.    ProviderViral MD   gabapentin (NEURONTIN) 600 MG tablet Every 8 (Eight) Hours.    Viral Stearns MD   METHADONE 50MG/ML ORAL CONC methadone oral take 1  by oral route daily   Active    Viral Stearns MD   oxyCODONE-acetaminophen (Percocet)  MG per tablet Every 6 (Six) Hours.    Viral Stearns MD   Pancrelipase, Lip-Prot-Amyl, (Creon) 27724-347952 units capsule delayed-release particles capsule Every 6 (Six) Hours.    Viral Stearns MD   SUMAtriptan (Imitrex) 50 MG tablet Daily.    Viral Stearns MD        Social History:   Social History     Substance Use Topics   • Alcohol use: Not Currently         Review of Systems:  Review of Systems   Constitutional: Negative  "for chills and fever.   HENT: Positive for dental problem and facial swelling.    Gastrointestinal: Negative for abdominal pain, nausea and vomiting.        Physical Exam:  /75   Pulse 86   Temp 98.7 °F (37.1 °C) (Oral)   Resp 18   Ht 182.9 cm (72\")   Wt 68 kg (150 lb)   SpO2 100%   BMI 20.34 kg/m²     Physical Exam  Constitutional:       General: He is not in acute distress.     Appearance: He is not ill-appearing or toxic-appearing.   HENT:      Mouth/Throat:      Dentition: Abnormal dentition. Dental tenderness, gingival swelling, dental caries and dental abscesses present.     Cardiovascular:      Rate and Rhythm: Normal rate.   Pulmonary:      Effort: Pulmonary effort is normal. No respiratory distress.   Neurological:      Mental Status: He is alert.                  Procedures:  Procedures      Medical Decision Making:      Comorbidities that affect care:    None    External Notes reviewed:    None      The following orders were placed and all results were independently analyzed by me:  No orders of the defined types were placed in this encounter.      Medications Given in the Emergency Department:  Medications   ketorolac (TORADOL) injection 30 mg (30 mg Intramuscular Given 5/21/23 1755)   acetaminophen (TYLENOL) 160 MG/5ML solution 650 mg (650 mg Oral Given 5/21/23 1757)   benzocaine (HURRICAINE) 20 % liquid solution 1 spray (1 spray Mouth/Throat Given 5/21/23 1757)        ED Course:         Labs:    Lab Results (last 24 hours)     ** No results found for the last 24 hours. **           Imaging:    No Radiology Exams Resulted Within Past 24 Hours      Differential Diagnosis and Discussion:    Dental Pain: Differential diagnosis includes but is not limited to dental caries, periodontitis, pericoronitis, peridental abscess, gingival abscess, apthous stomatitis, allergic stomatitis, acute necrotizing ulcerative gingivitis, herpetic stomatitis.        MDM  Number of Diagnoses or Management " Options           Patient Care Considerations:    LABS: I considered ordering labs, however Patient is afebrile without systemic symptoms and his pain and swelling is localized to the right side.      Consultants/Shared Management Plan:    None    Social Determinants of Health:    Patient is independent, reliable, and has access to care.       Disposition and Care Coordination:    Discharged: The patient is suitable and stable for discharge with no need for consideration of observation or admission.    I have explained the patient´s condition, diagnoses and treatment plan based on the information available to me at this time. I have answered questions and addressed any concerns. The patient has a good  understanding of the patient´s diagnosis, condition, and treatment plan as can be expected at this point. The vital signs have been stable. The patient´s condition is stable and appropriate for discharge from the emergency department.      The patient will pursue further outpatient evaluation with the primary care physician or other designated or consulting physician as outlined in the discharge instructions. They are agreeable to this plan of care and follow-up instructions have been explained in detail. The patient has received these instructions in written format and have expressed an understanding of the discharge instructions. The patient is aware that any significant change in condition or worsening of symptoms should prompt an immediate return to this or the closest emergency department or call to 911.    Final diagnoses:   Dental abscess   Pain, dental   Right facial swelling        ED Disposition     ED Disposition   Discharge    Condition   Stable    Comment   Patient discharged.              This medical record created using voice recognition software.           Cherelle Alberto, RONALD  05/28/23 [id number]

## 2023-05-22 RX ORDER — CLINDAMYCIN HYDROCHLORIDE 150 MG/1
450 CAPSULE ORAL 3 TIMES DAILY
Qty: 126 CAPSULE | Refills: 0 | Status: SHIPPED | OUTPATIENT
Start: 2023-05-22 | End: 2023-06-05

## 2023-08-21 ENCOUNTER — APPOINTMENT (OUTPATIENT)
Dept: CT IMAGING | Facility: HOSPITAL | Age: 38
End: 2023-08-21
Payer: MEDICARE

## 2023-08-21 ENCOUNTER — HOSPITAL ENCOUNTER (EMERGENCY)
Facility: HOSPITAL | Age: 38
Discharge: HOME | End: 2023-08-21
Attending: EMERGENCY MEDICINE
Payer: MEDICARE

## 2023-08-21 VITALS
TEMPERATURE: 98.1 F | DIASTOLIC BLOOD PRESSURE: 101 MMHG | HEIGHT: 72 IN | HEART RATE: 57 BPM | WEIGHT: 156.75 LBS | SYSTOLIC BLOOD PRESSURE: 161 MMHG | RESPIRATION RATE: 18 BRPM | OXYGEN SATURATION: 100 % | BODY MASS INDEX: 21.23 KG/M2

## 2023-08-21 DIAGNOSIS — K85.90 ACUTE PANCREATITIS WITHOUT INFECTION OR NECROSIS, UNSPECIFIED PANCREATITIS TYPE: Primary | ICD-10-CM

## 2023-08-21 LAB
ALBUMIN SERPL-MCNC: 4.1 G/DL (ref 3.5–5.2)
ALBUMIN/GLOB SERPL: 1.8 G/DL
ALP SERPL-CCNC: 62 U/L (ref 39–117)
ALT SERPL W P-5'-P-CCNC: 5 U/L (ref 1–41)
ANION GAP SERPL CALCULATED.3IONS-SCNC: 11.5 MMOL/L (ref 5–15)
AST SERPL-CCNC: 10 U/L (ref 1–40)
BASOPHILS # BLD AUTO: 0.04 10*3/MM3 (ref 0–0.2)
BASOPHILS NFR BLD AUTO: 0.4 % (ref 0–1.5)
BILIRUB SERPL-MCNC: 0.7 MG/DL (ref 0–1.2)
BILIRUB UR QL STRIP: NEGATIVE
BUN SERPL-MCNC: 13 MG/DL (ref 6–20)
BUN/CREAT SERPL: 14 (ref 7–25)
CALCIUM SPEC-SCNC: 8.8 MG/DL (ref 8.6–10.5)
CHLORIDE SERPL-SCNC: 103 MMOL/L (ref 98–107)
CLARITY UR: CLEAR
CO2 SERPL-SCNC: 22.5 MMOL/L (ref 22–29)
COLOR UR: YELLOW
CREAT SERPL-MCNC: 0.93 MG/DL (ref 0.76–1.27)
DEPRECATED RDW RBC AUTO: 44.1 FL (ref 37–54)
EGFRCR SERPLBLD CKD-EPI 2021: 107.1 ML/MIN/1.73
EOSINOPHIL # BLD AUTO: 0.22 10*3/MM3 (ref 0–0.4)
EOSINOPHIL NFR BLD AUTO: 2.2 % (ref 0.3–6.2)
ERYTHROCYTE [DISTWIDTH] IN BLOOD BY AUTOMATED COUNT: 13.9 % (ref 12.3–15.4)
GLOBULIN UR ELPH-MCNC: 2.3 GM/DL
GLUCOSE SERPL-MCNC: 119 MG/DL (ref 65–99)
GLUCOSE UR STRIP-MCNC: ABNORMAL MG/DL
HCT VFR BLD AUTO: 39.3 % (ref 37.5–51)
HGB BLD-MCNC: 12.5 G/DL (ref 13–17.7)
HGB UR QL STRIP.AUTO: NEGATIVE
HOLD SPECIMEN: NORMAL
HOLD SPECIMEN: NORMAL
IMM GRANULOCYTES # BLD AUTO: 0.04 10*3/MM3 (ref 0–0.05)
IMM GRANULOCYTES NFR BLD AUTO: 0.4 % (ref 0–0.5)
KETONES UR QL STRIP: ABNORMAL
LEUKOCYTE ESTERASE UR QL STRIP.AUTO: NEGATIVE
LIPASE SERPL-CCNC: 139 U/L (ref 13–60)
LYMPHOCYTES # BLD AUTO: 1.97 10*3/MM3 (ref 0.7–3.1)
LYMPHOCYTES NFR BLD AUTO: 19.7 % (ref 19.6–45.3)
MCH RBC QN AUTO: 27.4 PG (ref 26.6–33)
MCHC RBC AUTO-ENTMCNC: 31.8 G/DL (ref 31.5–35.7)
MCV RBC AUTO: 86.2 FL (ref 79–97)
MONOCYTES # BLD AUTO: 0.36 10*3/MM3 (ref 0.1–0.9)
MONOCYTES NFR BLD AUTO: 3.6 % (ref 5–12)
NEUTROPHILS NFR BLD AUTO: 7.37 10*3/MM3 (ref 1.7–7)
NEUTROPHILS NFR BLD AUTO: 73.7 % (ref 42.7–76)
NITRITE UR QL STRIP: NEGATIVE
NRBC BLD AUTO-RTO: 0 /100 WBC (ref 0–0.2)
PH UR STRIP.AUTO: 6.5 [PH] (ref 5–8)
PLATELET # BLD AUTO: 398 10*3/MM3 (ref 140–450)
PMV BLD AUTO: 9.6 FL (ref 6–12)
POTASSIUM SERPL-SCNC: 3.6 MMOL/L (ref 3.5–5.2)
PROT SERPL-MCNC: 6.4 G/DL (ref 6–8.5)
PROT UR QL STRIP: NEGATIVE
RBC # BLD AUTO: 4.56 10*6/MM3 (ref 4.14–5.8)
SODIUM SERPL-SCNC: 137 MMOL/L (ref 136–145)
SP GR UR STRIP: 1.02 (ref 1–1.03)
UROBILINOGEN UR QL STRIP: ABNORMAL
WBC NRBC COR # BLD: 10 10*3/MM3 (ref 3.4–10.8)
WHOLE BLOOD HOLD COAG: NORMAL
WHOLE BLOOD HOLD SPECIMEN: NORMAL

## 2023-08-21 PROCEDURE — 85025 COMPLETE CBC W/AUTO DIFF WBC: CPT

## 2023-08-21 PROCEDURE — 81003 URINALYSIS AUTO W/O SCOPE: CPT | Performed by: EMERGENCY MEDICINE

## 2023-08-21 PROCEDURE — 25510000001 IOPAMIDOL PER 1 ML: Performed by: EMERGENCY MEDICINE

## 2023-08-21 PROCEDURE — 96374 THER/PROPH/DIAG INJ IV PUSH: CPT

## 2023-08-21 PROCEDURE — 83690 ASSAY OF LIPASE: CPT

## 2023-08-21 PROCEDURE — 25010000002 HYDROMORPHONE 1 MG/ML SOLUTION: Performed by: EMERGENCY MEDICINE

## 2023-08-21 PROCEDURE — 80053 COMPREHEN METABOLIC PANEL: CPT

## 2023-08-21 PROCEDURE — 96375 TX/PRO/DX INJ NEW DRUG ADDON: CPT

## 2023-08-21 PROCEDURE — 99285 EMERGENCY DEPT VISIT HI MDM: CPT

## 2023-08-21 PROCEDURE — 25010000002 METOCLOPRAMIDE PER 10 MG: Performed by: EMERGENCY MEDICINE

## 2023-08-21 PROCEDURE — 96376 TX/PRO/DX INJ SAME DRUG ADON: CPT

## 2023-08-21 PROCEDURE — 74177 CT ABD & PELVIS W/CONTRAST: CPT

## 2023-08-21 PROCEDURE — 25010000002 ONDANSETRON PER 1 MG: Performed by: EMERGENCY MEDICINE

## 2023-08-21 RX ORDER — PANTOPRAZOLE SODIUM 40 MG/1
40 TABLET, DELAYED RELEASE ORAL DAILY
COMMUNITY

## 2023-08-21 RX ORDER — PROMETHAZINE HYDROCHLORIDE 25 MG/1
25 TABLET ORAL EVERY 6 HOURS PRN
Qty: 20 TABLET | Refills: 0 | Status: SHIPPED | OUTPATIENT
Start: 2023-08-21

## 2023-08-21 RX ORDER — SODIUM CHLORIDE 0.9 % (FLUSH) 0.9 %
10 SYRINGE (ML) INJECTION AS NEEDED
Status: DISCONTINUED | OUTPATIENT
Start: 2023-08-21 | End: 2023-08-21 | Stop reason: HOSPADM

## 2023-08-21 RX ORDER — OXYCODONE HYDROCHLORIDE AND ACETAMINOPHEN 5; 325 MG/1; MG/1
1 TABLET ORAL EVERY 6 HOURS PRN
Qty: 15 TABLET | Refills: 0 | Status: SHIPPED | OUTPATIENT
Start: 2023-08-21

## 2023-08-21 RX ORDER — CLINDAMYCIN HYDROCHLORIDE 150 MG/1
150 CAPSULE ORAL 3 TIMES DAILY
COMMUNITY
Start: 2023-05-22

## 2023-08-21 RX ORDER — METOCLOPRAMIDE HYDROCHLORIDE 5 MG/ML
10 INJECTION INTRAMUSCULAR; INTRAVENOUS ONCE
Status: COMPLETED | OUTPATIENT
Start: 2023-08-21 | End: 2023-08-21

## 2023-08-21 RX ORDER — ONDANSETRON 4 MG/1
4 TABLET, ORALLY DISINTEGRATING ORAL EVERY 6 HOURS PRN
Qty: 20 TABLET | Refills: 0 | Status: SHIPPED | OUTPATIENT
Start: 2023-08-21

## 2023-08-21 RX ORDER — ONDANSETRON 2 MG/ML
4 INJECTION INTRAMUSCULAR; INTRAVENOUS ONCE
Status: COMPLETED | OUTPATIENT
Start: 2023-08-21 | End: 2023-08-21

## 2023-08-21 RX ADMIN — ONDANSETRON 4 MG: 2 INJECTION INTRAMUSCULAR; INTRAVENOUS at 07:24

## 2023-08-21 RX ADMIN — HYDROMORPHONE HYDROCHLORIDE 1 MG: 1 INJECTION, SOLUTION INTRAMUSCULAR; INTRAVENOUS; SUBCUTANEOUS at 07:35

## 2023-08-21 RX ADMIN — HYDROMORPHONE HYDROCHLORIDE 1 MG: 1 INJECTION, SOLUTION INTRAMUSCULAR; INTRAVENOUS; SUBCUTANEOUS at 14:02

## 2023-08-21 RX ADMIN — IOPAMIDOL 100 ML: 755 INJECTION, SOLUTION INTRAVENOUS at 08:13

## 2023-08-21 RX ADMIN — METOCLOPRAMIDE HYDROCHLORIDE 10 MG: 5 INJECTION INTRAMUSCULAR; INTRAVENOUS at 10:29

## 2023-08-21 RX ADMIN — HYDROMORPHONE HYDROCHLORIDE 1 MG: 1 INJECTION, SOLUTION INTRAMUSCULAR; INTRAVENOUS; SUBCUTANEOUS at 10:29

## 2023-08-21 NOTE — ED NOTES
"Patients mother was contacted for transportation home, patients mother states \" I dont know how he's gonna get home\" patient staff asked on behalf of patient if she could, patients mother states \"I dont even have a car\".... patients mother also states everyone is at work but shell try to find somebody  "

## 2023-08-21 NOTE — ED PROVIDER NOTES
Time: 6:59 AM EDT  Date of encounter:  8/21/2023  Independent Historian/Clinical History and Information was obtained by:   Patient    History is limited by: N/A    Chief Complaint: Abdominal pain      History of Present Illness:  Patient is a 38 y.o. year old male who presents to the emergency department for evaluation of esvin pain.  Patient has history of pancreatitis and sees Dr. Velázquez at [facility].  Patient is previously required ductal stenting but does not currently have any stents in place.  Patient states he is been having episodes of pain and nausea over the last 2 months where he will be fine for several days and then it flares up again.  Patient has an appointment with pain management on Monday.    HPI    Patient Care Team  Primary Care Provider: Provider, No Known    Past Medical History:     Allergies   Allergen Reactions    Bupropion Anaphylaxis    Latex Itching    Penicillins Unknown - High Severity     History reviewed. No pertinent past medical history.  History reviewed. No pertinent surgical history.  History reviewed. No pertinent family history.    Home Medications:  Prior to Admission medications    Medication Sig Start Date End Date Taking? Authorizing Provider   albuterol sulfate  (90 Base) MCG/ACT inhaler Every 4 (Four) Hours.    Viral Stearns MD   fluticasone (FLONASE) 50 MCG/ACT nasal spray Daily.    Viral Stearns MD   gabapentin (NEURONTIN) 600 MG tablet Every 8 (Eight) Hours.    Viral Stearns MD   METHADONE 50MG/ML ORAL CONC methadone oral take 1  by oral route daily   Active    Viral Stearns MD   oxyCODONE-acetaminophen (PERCOCET)  MG per tablet Every 6 (Six) Hours.    Viral Stearns MD   Pancrelipase, Lip-Prot-Amyl, (Creon) 77547-599427 units capsule delayed-release particles capsule Every 6 (Six) Hours.    Viral Stearns MD   pantoprazole (PROTONIX) 40 MG EC tablet Take 1 tablet by mouth Daily.    Viral Stearns MD  "  SUMAtriptan (IMITREX) 50 MG tablet Daily.    Provider, Viral, MD        Social History:   Social History     Substance Use Topics    Alcohol use: Not Currently         Review of Systems:  Review of Systems   Gastrointestinal:  Positive for abdominal pain, nausea and vomiting.      Physical Exam:  BP (!) 161/101   Pulse 57   Temp 98.1 øF (36.7 øC) (Oral)   Resp 18   Ht 182.9 cm (72\")   Wt 71.1 kg (156 lb 12 oz)   SpO2 100%   BMI 21.26 kg/mý     Physical Exam  Vitals and nursing note reviewed.   Constitutional:       General: He is in acute distress.   HENT:      Head: Normocephalic.   Cardiovascular:      Rate and Rhythm: Normal rate and regular rhythm.      Heart sounds: Normal heart sounds.   Pulmonary:      Effort: Pulmonary effort is normal. No respiratory distress.      Breath sounds: Normal breath sounds.   Abdominal:      General: Abdomen is flat. Bowel sounds are decreased.      Palpations: Abdomen is soft.      Tenderness: There is no abdominal tenderness in the epigastric area.   Musculoskeletal:         General: Normal range of motion.      Cervical back: Normal range of motion and neck supple.   Skin:     General: Skin is warm and dry.   Neurological:      Mental Status: He is alert and oriented to person, place, and time. Mental status is at baseline.                Procedures:  Procedures      Medical Decision Making:      Comorbidities that affect care:    History of acute and chronic pancreatitis    External Notes reviewed:    None      The following orders were placed and all results were independently analyzed by me:  Orders Placed This Encounter   Procedures    CT Abdomen Pelvis With Contrast    [place] Draw    Comprehensive Metabolic Panel    Lipase    Urinalysis With Microscopic If Indicated (No Culture) - Urine, Clean Catch    CBC Auto Differential    Undress & Gown    CBC & Differential    Green Top (Gel)    Lavender Top    Gold Top - SST    Light Blue Top       Medications Given " in the Emergency Department:  Medications   HYDROmorphone (DILAUDID) injection 1 mg (1 mg Intravenous Given 8/21/23 0735)   ondansetron (ZOFRAN) injection 4 mg (4 mg Intravenous Given 8/21/23 0724)   iopamidol (ISOVUE-370) 76 % injection 100 mL (100 mL Intravenous Given 8/21/23 0813)   HYDROmorphone (DILAUDID) injection 1 mg (1 mg Intravenous Given 8/21/23 1029)   metoclopramide (REGLAN) injection 10 mg (10 mg Intravenous Given 8/21/23 1029)   HYDROmorphone (DILAUDID) injection 1 mg (1 mg Intravenous Given 8/21/23 1402)        ED Course:         Labs:    Lab Results (last 24 hours)       Procedure Component Value Units Date/Time    CBC & Differential [[id number]]  (Abnormal) Collected: 08/21/23 0532    Specimen: Blood from Arm, Left Updated: 08/21/23 0557    Narrative:      The following orders were created for panel order CBC & Differential.  Procedure                               Abnormality         Status                     ---------                               -----------         ------                     CBC Auto Differential[[id number]]        Abnormal            Final result                 Please view results for these tests on the individual orders.    Comprehensive Metabolic Panel [[id number]]  (Abnormal) Collected: 08/21/23 0532    Specimen: Blood from Arm, Left Updated: 08/21/23 0613     Glucose 119 mg/dL      BUN 13 mg/dL      Creatinine 0.93 mg/dL      Sodium 137 mmol/L      Potassium 3.6 mmol/L      Chloride 103 mmol/L      CO2 22.5 mmol/L      Calcium 8.8 mg/dL      Total Protein 6.4 g/dL      Albumin 4.1 g/dL      ALT (SGPT) 5 U/L      AST (SGOT) 10 U/L      Alkaline Phosphatase 62 U/L      Total Bilirubin 0.7 mg/dL      Globulin 2.3 gm/dL      A/G Ratio 1.8 g/dL      BUN/Creatinine Ratio 14.0     Anion Gap 11.5 mmol/L      eGFR 107.1 mL/min/1.73     Narrative:      GFR Normal >60  Chronic Kidney Disease <60  Kidney Failure <15      Lipase [[id number]]  (Abnormal) Collected: 08/21/23 0532     Specimen: Blood from Arm, Left Updated: 08/21/23 0613     Lipase 139 U/L     CBC Auto Differential [[id number]]  (Abnormal) Collected: 08/21/23 0532    Specimen: Blood from Arm, Left Updated: 08/21/23 0557     WBC 10.00 10*3/mm3      RBC 4.56 10*6/mm3      Hemoglobin 12.5 g/dL      Hematocrit 39.3 %      MCV 86.2 fL      MCH 27.4 pg      MCHC 31.8 g/dL      RDW 13.9 %      RDW-SD 44.1 fl      MPV 9.6 fL      Platelets 398 10*3/mm3      Neutrophil % 73.7 %      Lymphocyte % 19.7 %      Monocyte % 3.6 %      Eosinophil % 2.2 %      Basophil % 0.4 %      Immature Grans % 0.4 %      Neutrophils, Absolute 7.37 10*3/mm3      Lymphocytes, Absolute 1.97 10*3/mm3      Monocytes, Absolute 0.36 10*3/mm3      Eosinophils, Absolute 0.22 10*3/mm3      Basophils, Absolute 0.04 10*3/mm3      Immature Grans, Absolute 0.04 10*3/mm3      nRBC 0.0 /100 WBC     Urinalysis With Microscopic If Indicated (No Culture) - Urine, Clean Catch [[id number]]  (Abnormal) Collected: 08/21/23 0740    Specimen: Urine, Clean Catch Updated: 08/21/23 0809     Color, UA Yellow     Appearance, UA Clear     pH, UA 6.5     Specific Gravity, UA 1.018     Glucose,  mg/dL (Trace)     Ketones, UA 15 mg/dL (1+)     Bilirubin, UA Negative     Blood, UA Negative     Protein, UA Negative     Leuk Esterase, UA Negative     Nitrite, UA Negative     Urobilinogen, UA 0.2 E.U./dL    Narrative:      Urine microscopic not indicated.             Imaging:    CT Abdomen Pelvis With Contrast    Result Date: 8/21/2023  PROCEDURE: CT ABDOMEN PELVIS W CONTRAST  COMPARISON: [facility], CT, ABDOMEN/PELVIS WITH CONTRAST, 5/23/2021, 8:35.  INDICATIONS: abdominal pain, chronic pancreatitis per the patient  TECHNIQUE: After obtaining the patient's consent, CT images were created with non-ionic intravenous contrast material.   PROTOCOL:   Standard imaging protocol performed    RADIATION:   DLP: 199mGy*cm   Automated exposure control was utilized to minimize radiation  dose. CONTRAST: 75cc Isovue 370 I.V.  FINDINGS:    Lung bases:  Limited imaging lung bases is grossly clear.  No free air is noted below the diaphragm.  Organs:  The gallbladder, spleen, kidneys and adrenal glands are grossly unremarkable in appearance.  The liver is somewhat heterogeneous in its appearance without definite focal lesion appreciated.  Common duct appears grossly unremarkable in appearance.  There is mild dilation of the pancreatic duct a small amount of calcification noted distally within the pancreas.  Findings are similar to the prior study.  No acute inflammatory change appreciated  GI tract:  The stomach is grossly unremarkable in appearance.  Small bowel demonstrates no evidence of obstruction.  Small lymph nodes within the mesentery are noted.  There is a small amount of fluid within the pelvis.  The colon including the appendix demonstrates no acute abnormality.  A few scattered diverticuli are noted.  Pelvis:  Diffuse wall thickening of the bladder is accentuated by incomplete distention.  Small amount of fluid within the pelvis noted as mentioned above.  No suspicious adenopathy.  Retroperitoneum:  The aorta is normal in caliber.  There is no suspicious retroperitoneal adenopathy.  Bones and soft tissues:  No destructive bone lesion.        1. Small amount of fluid noted within the pelvis which is nonspecific but abnormal in a male.  No definite acute abnormality appreciated of the GI tract.  There is diffuse wall thickening noted of the urinary bladder which may be artifactual due to incomplete distention .  Underlying cystitis is not excluded.  Please correlate with urinalysis. 2. Heterogeneous appearance of the liver without discrete focal lesion identified.  Please correlate with liver function test 3. Small amount of calcification within the pancreas and chronic mild dilation of the pancreatic duct.  Findings may represent sequela of prior, chronic pancreatitis     JOSE BLAKE MD        Electronically Signed and Approved By: JOSE BLAKE MD on 8/21/2023 at 9:12                Differential Diagnosis and Discussion:    Abdominal Pain: Based on the patient's signs and symptoms, I considered abdominal aortic aneurysm, small bowel obstruction, pancreatitis, acute cholecystitis, acute appendecitis, peptic ulcer disease, gastritis, colitis, endocrine disorders, irritable bowel syndrome and other differential diagnosis an etiology of the patient's abdominal pain.    All labs were reviewed and interpreted by me.  CT scan radiology impression was interpreted by me.    MDM     Patient's work-up shows a mildly elevated lipase.  His CT scan shows stable pancreatic changes consistent with chronic pancreatitis.  Patient's pain was controlled with medication he is stable for discharge with outpatient follow-up with his gastroenterologist at [facility].      Patient Care Considerations:        Consultants/Shared Management Plan:    None    Social Determinants of Health:    Patient is independent, reliable, and has access to care.       Disposition and Care Coordination:    Discharged: I considered escalation of care by admitting this patient for observation, however the patient has improved and is suitable and  stable for discharge.    I have explained discharge medications and the need for follow up with the patient/caretakers. This was also printed in the discharge instructions. Patient was discharged with the following medications and follow up:      Medication List        New Prescriptions      ondansetron ODT 4 MG disintegrating tablet  Commonly known as: ZOFRAN-ODT  Place 1 tablet on the tongue Every 6 (Six) Hours As Needed for Nausea or Vomiting.     promethazine 25 MG tablet  Commonly known as: PHENERGAN  Take 1 tablet by mouth Every 6 (Six) Hours As Needed for Nausea or Vomiting.            Changed      * oxyCODONE-acetaminophen  MG per tablet  Commonly known as: PERCOCET  What changed: Another  medication with the same name was added. Make sure you understand how and when to take each.     * oxyCODONE-acetaminophen 5-325 MG per tablet  Commonly known as: PERCOCET  Take 1 tablet by mouth Every 6 (Six) Hours As Needed for Severe Pain.  What changed: You were already taking a medication with the same name, and this prescription was added. Make sure you understand how and when to take each.           * This list has 2 medication(s) that are the same as other medications prescribed for you. Read the directions carefully, and ask your doctor or other care provider to review them with you.                   Where to Get Your Medications        These medications were sent to [id number] - [place], [place] - [address] AT [facility] - [phone] [facility] [phone] FX  [address] [place] [address]      Phone: [phone]   ondansetron ODT 4 MG disintegrating tablet  oxyCODONE-acetaminophen 5-325 MG per tablet  promethazine 25 MG tablet      Conner Orr MD  [address] [place], [place] [address] [phone]    Schedule an appointment as soon as possible for a visit         Final diagnoses:   Acute pancreatitis without infection or necrosis, unspecified pancreatitis type        ED Disposition       ED Disposition   Discharge    Condition   Stable    Comment   --               This medical record created using voice recognition software.             Chemo Cabello DO  08/21/23 8492

## 2024-05-17 ENCOUNTER — HOSPITAL ENCOUNTER (EMERGENCY)
Facility: HOSPITAL | Age: 39
Discharge: HOME | End: 2024-05-17
Attending: EMERGENCY MEDICINE
Payer: MEDICARE

## 2024-05-17 ENCOUNTER — APPOINTMENT (OUTPATIENT)
Dept: GENERAL RADIOLOGY | Facility: HOSPITAL | Age: 39
End: 2024-05-17
Payer: MEDICARE

## 2024-05-17 VITALS
WEIGHT: 157.19 LBS | HEART RATE: 92 BPM | SYSTOLIC BLOOD PRESSURE: 157 MMHG | BODY MASS INDEX: 21.29 KG/M2 | DIASTOLIC BLOOD PRESSURE: 98 MMHG | OXYGEN SATURATION: 100 % | HEIGHT: 72 IN | RESPIRATION RATE: 17 BRPM | TEMPERATURE: 97.8 F

## 2024-05-17 DIAGNOSIS — M25.512 ACUTE PAIN OF LEFT SHOULDER: Primary | ICD-10-CM

## 2024-05-17 PROCEDURE — 99283 EMERGENCY DEPT VISIT LOW MDM: CPT

## 2024-05-17 PROCEDURE — 96372 THER/PROPH/DIAG INJ SC/IM: CPT

## 2024-05-17 PROCEDURE — 73030 X-RAY EXAM OF SHOULDER: CPT

## 2024-05-17 PROCEDURE — 25010000002 KETOROLAC TROMETHAMINE PER 15 MG: Performed by: NURSE PRACTITIONER

## 2024-05-17 RX ORDER — CYCLOBENZAPRINE HCL 10 MG
10 TABLET ORAL ONCE
Status: COMPLETED | OUTPATIENT
Start: 2024-05-17 | End: 2024-05-17

## 2024-05-17 RX ORDER — KETOROLAC TROMETHAMINE 10 MG/1
10 TABLET, FILM COATED ORAL EVERY 6 HOURS PRN
Qty: 20 TABLET | Refills: 0 | Status: SHIPPED | OUTPATIENT
Start: 2024-05-17

## 2024-05-17 RX ORDER — CYCLOBENZAPRINE HCL 10 MG
10 TABLET ORAL 3 TIMES DAILY PRN
Qty: 15 TABLET | Refills: 0 | Status: SHIPPED | OUTPATIENT
Start: 2024-05-17

## 2024-05-17 RX ORDER — KETOROLAC TROMETHAMINE 30 MG/ML
60 INJECTION, SOLUTION INTRAMUSCULAR; INTRAVENOUS ONCE
Status: COMPLETED | OUTPATIENT
Start: 2024-05-17 | End: 2024-05-17

## 2024-05-17 RX ORDER — LIDOCAINE 50 MG/G
1 PATCH TOPICAL EVERY 24 HOURS
Qty: 5 PATCH | Refills: 0 | Status: SHIPPED | OUTPATIENT
Start: 2024-05-17

## 2024-05-17 RX ADMIN — CYCLOBENZAPRINE 10 MG: 10 TABLET, FILM COATED ORAL at 04:22

## 2024-05-17 RX ADMIN — KETOROLAC TROMETHAMINE 60 MG: 30 INJECTION, SOLUTION INTRAMUSCULAR at 04:22

## 2024-05-17 NOTE — ED PROVIDER NOTES
Time: 3:37 AM EDT  Date of encounter:  5/17/2024  Independent Historian/Clinical History and Information was obtained by:   Patient    History is limited by: N/A    Chief Complaint: Shoulder pain      History of Present Illness:  Patient is a 39 y.o. year old male who presents to the emergency department for evaluation of left shoulder pain x 1 week.  No known injury but thinks maybe he slept on it wrong.  No numbness tingling or weakness.  Patient states that it hurts from his side of his arm all the way up into the left side of his neck.  Patient is right-handed no previous injury    HPI    Patient Care Team  Primary Care Provider: Provider, No Known    Past Medical History:     Allergies   Allergen Reactions    Bupropion Anaphylaxis    Latex Itching    Penicillins Unknown - High Severity     History reviewed. No pertinent past medical history.  History reviewed. No pertinent surgical history.  History reviewed. No pertinent family history.    Home Medications:  Prior to Admission medications    Medication Sig Start Date End Date Taking? Authorizing Provider   albuterol sulfate  (90 Base) MCG/ACT inhaler Every 4 (Four) Hours.    Viral Stearns MD   clindamycin (CLEOCIN) 150 MG capsule Take 1 capsule by mouth 3 (Three) Times a Day. 5/22/23   Viral Stearns MD   fluticasone (FLONASE) 50 MCG/ACT nasal spray Daily.    Viral Stearns MD   gabapentin (NEURONTIN) 600 MG tablet Every 8 (Eight) Hours.    Viral Stearns MD   METHADONE 50MG/ML ORAL CONC methadone oral take 1  by oral route daily   Active    Viral Stearns MD   ondansetron ODT (ZOFRAN-ODT) 4 MG disintegrating tablet Place 1 tablet on the tongue Every 6 (Six) Hours As Needed for Nausea or Vomiting. 8/21/23   Chemo Cabello,    oxyCODONE-acetaminophen (PERCOCET)  MG per tablet Every 6 (Six) Hours.    Viral Stearns MD   oxyCODONE-acetaminophen (PERCOCET) 5-325 MG per tablet Take 1 tablet by mouth Every 6  "(Six) Hours As Needed for Severe Pain. 8/21/23   Chemo Cabello DO   Pancrelipase, Lip-Prot-Amyl, (Creon) 23828-060436 units capsule delayed-release particles capsule Every 6 (Six) Hours.    ProviderViral MD   pantoprazole (PROTONIX) 40 MG EC tablet Take 1 tablet by mouth Daily.    Viral Stearns MD   promethazine (PHENERGAN) 25 MG tablet Take 1 tablet by mouth Every 6 (Six) Hours As Needed for Nausea or Vomiting. 8/21/23   Chemo Cabello DO   SUMAtriptan (IMITREX) 50 MG tablet Daily.    Provider, MD Viral        Social History:   Social History     Substance Use Topics    Alcohol use: Not Currently         Review of Systems:  Review of Systems   Constitutional:  Negative for fever.   Cardiovascular:  Negative for chest pain, palpitations and leg swelling.   Genitourinary:  Negative for flank pain.   Musculoskeletal:  Negative for back pain and neck pain.   Skin:  Negative for rash.   Neurological:  Negative for weakness, numbness and headaches.   Hematological: Negative.    Psychiatric/Behavioral: Negative.     All other systems reviewed and are negative.       Physical Exam:  /88   Pulse 99   Temp 97.8 °F (36.6 °C) (Oral)   Resp 19   Ht 182.9 cm (72\")   Wt 71.3 kg (157 lb 3 oz)   SpO2 100%   BMI 21.32 kg/m²     Physical Exam  Vitals and nursing note reviewed.   Constitutional:       General: He is not in acute distress.     Appearance: Normal appearance. He is not toxic-appearing.   HENT:      Head: Normocephalic and atraumatic.      Right Ear: Tympanic membrane, ear canal and external ear normal.      Left Ear: Tympanic membrane, ear canal and external ear normal.      Nose: Nose normal.      Mouth/Throat:      Mouth: Mucous membranes are moist.   Eyes:      General: No scleral icterus.     Conjunctiva/sclera: Conjunctivae normal.   Cardiovascular:      Rate and Rhythm: Normal rate and regular rhythm.      Pulses: Normal pulses.      Heart sounds: Normal heart sounds.   Pulmonary: "      Effort: Pulmonary effort is normal. No respiratory distress.      Breath sounds: Normal breath sounds.   Chest:      Chest wall: No tenderness.   Abdominal:      Tenderness: There is no abdominal tenderness.   Musculoskeletal:         General: Tenderness (Left deltoid and left posterior shoulder) present.      Cervical back: Normal range of motion and neck supple. Tenderness (Tenderness left trapezius) present.      Comments: Patient has full range of motion but painful left shoulder   Skin:     General: Skin is warm and dry.      Capillary Refill: Capillary refill takes less than 2 seconds.   Neurological:      Mental Status: He is alert and oriented to person, place, and time.      Sensory: No sensory deficit.      Motor: No weakness.   Psychiatric:         Mood and Affect: Mood normal.         Behavior: Behavior normal.            Medical Decision Making:      Comorbidities that affect care:    None    External Notes reviewed:    Previous Clinic Note: Patient seen by PCP back in December for medication refills      The following orders were placed and all results were independently analyzed by me:  Orders Placed This Encounter   Procedures    XR Shoulder 2+ View Left       Medications Given in the Emergency Department:  Medications   ketorolac (TORADOL) injection 60 mg (60 mg Intramuscular Given 5/17/24 0422)   cyclobenzaprine (FLEXERIL) tablet 10 mg (10 mg Oral Given 5/17/24 0422)        ED Course:    ED Course as of 05/17/24 0445   Fri May 17, 2024   0402 XR Shoulder 2+ View Left  Negative left shoulder [DS]      ED Course User Index  [DS] Yee Najera APRN       Labs:    Lab Results (last 24 hours)       ** No results found for the last 24 hours. **             Imaging:    XR Shoulder 2+ View Left    Result Date: 5/17/2024  XR SHOULDER 2+ VW LEFT-: 5/17/2024 3:30 AM  INDICATION: Pain for 1 week.  COMPARISON: None available.  FINDINGS: 3 views of the left shoulder.   No fracture or dislocation.  The  acromioclavicular and glenohumeral articulations are within normal limits.  No foreign body.      Negative left shoulder.  Electronically Signed By-Dr. Waldemar Christensen MD On:5/17/2024 3:45 AM         Differential Diagnosis and Discussion:    Extremity Pain: Differential diagnosis includes but is not limited to soft tissue sprain, tendonitis, tendon injury, dislocation, fracture, deep vein thrombosis, arterial insufficiency, osteoarthritis, bursitis, and ligamentous damage.    All X-rays impressions were independently interpreted by me.    MDM     Amount and/or Complexity of Data Reviewed  Tests in the radiology section of CPT®: reviewed           Patient Care Considerations:    NARCOTICS: I considered prescribing opiate pain medication as an outpatient, however no acute bony abnormality      Consultants/Shared Management Plan:    None    Social Determinants of Health:    Patient is independent, reliable, and has access to care.       Disposition and Care Coordination:    Discharged: The patient is suitable and stable for discharge with no need for consideration of admission.    I have explained the patient´s condition, diagnoses and treatment plan based on the information available to me at this time. I have answered questions and addressed any concerns. The patient has a good  understanding of the patient´s diagnosis, condition, and treatment plan as can be expected at this point. The vital signs have been stable. The patient´s condition is stable and appropriate for discharge from the emergency department.      The patient will pursue further outpatient evaluation with the primary care physician or other designated or consulting physician as outlined in the discharge instructions. They are agreeable to this plan of care and follow-up instructions have been explained in detail. The patient has received these instructions in written format and has expressed an understanding of the discharge instructions. The patient  is aware that any significant change in condition or worsening of symptoms should prompt an immediate return to this or the closest emergency department or call to 911.  I have explained discharge medications and the need for follow up with the patient/caretakers. This was also printed in the discharge instructions. Patient was discharged with the following medications and follow up:      Medication List        New Prescriptions      cyclobenzaprine 10 MG tablet  Commonly known as: FLEXERIL  Take 1 tablet by mouth 3 (Three) Times a Day As Needed for Muscle Spasms.     ketorolac 10 MG tablet  Commonly known as: TORADOL  Take 1 tablet by mouth Every 6 (Six) Hours As Needed for Moderate Pain, Mild Pain or Severe Pain.     lidocaine 5 %  Commonly known as: LIDODERM  Place 1 patch on the skin as directed by provider Daily. Remove after 12 hours.               Where to Get Your Medications        These medications were sent to [facility] - [place], [place] - [address] - [phone]  - [phone]   [address] [facility] [place] [address]      Phone: [phone]   cyclobenzaprine 10 MG tablet  ketorolac 10 MG tablet  lidocaine 5 %      Provider, No Known  [facility]  [place] [address]      As needed       Final diagnoses:   Acute pain of left shoulder        ED Disposition       ED Disposition   Discharge    Condition   Stable    Comment   --               This medical record created using voice recognition software.             Yee Najera, APRN  05/17/24 0445

## 2024-05-17 NOTE — DISCHARGE INSTRUCTIONS
No acute findings on x-ray.    Rest.  Alternate ice and moist heat    Take medication as prescribed.    Follow-up with PCP.    Return for new or worsening symptoms

## 2025-05-31 ENCOUNTER — APPOINTMENT (OUTPATIENT)
Dept: CT IMAGING | Facility: HOSPITAL | Age: 40
End: 2025-05-31
Payer: MEDICARE

## 2025-05-31 ENCOUNTER — HOSPITAL ENCOUNTER (EMERGENCY)
Facility: HOSPITAL | Age: 40
Discharge: HOME | End: 2025-05-31
Attending: EMERGENCY MEDICINE
Payer: MEDICARE

## 2025-05-31 VITALS
SYSTOLIC BLOOD PRESSURE: 99 MMHG | WEIGHT: 162.92 LBS | DIASTOLIC BLOOD PRESSURE: 60 MMHG | BODY MASS INDEX: 22.07 KG/M2 | HEART RATE: 57 BPM | RESPIRATION RATE: 17 BRPM | OXYGEN SATURATION: 98 % | HEIGHT: 72 IN | TEMPERATURE: 97.8 F

## 2025-05-31 DIAGNOSIS — R11.14 BILIOUS VOMITING WITH NAUSEA: Primary | ICD-10-CM

## 2025-05-31 DIAGNOSIS — R10.84 GENERALIZED ABDOMINAL PAIN: ICD-10-CM

## 2025-05-31 LAB
ALBUMIN SERPL-MCNC: 4.1 G/DL (ref 3.5–5.2)
ALBUMIN/GLOB SERPL: 1.2 G/DL
ALP SERPL-CCNC: 85 U/L (ref 39–117)
ALT SERPL W P-5'-P-CCNC: 8 U/L (ref 1–41)
ANION GAP SERPL CALCULATED.3IONS-SCNC: 11.6 MMOL/L (ref 5–15)
AST SERPL-CCNC: 13 U/L (ref 1–40)
BASOPHILS # BLD AUTO: 0.04 10*3/MM3 (ref 0–0.2)
BASOPHILS NFR BLD AUTO: 0.4 % (ref 0–1.5)
BILIRUB SERPL-MCNC: 0.3 MG/DL (ref 0–1.2)
BILIRUB UR QL STRIP: NEGATIVE
BUN SERPL-MCNC: 20.8 MG/DL (ref 6–20)
BUN/CREAT SERPL: 18.7 (ref 7–25)
CALCIUM SPEC-SCNC: 8.7 MG/DL (ref 8.6–10.5)
CHLORIDE SERPL-SCNC: 105 MMOL/L (ref 98–107)
CLARITY UR: CLEAR
CO2 SERPL-SCNC: 22.4 MMOL/L (ref 22–29)
COLOR UR: YELLOW
CREAT SERPL-MCNC: 1.11 MG/DL (ref 0.76–1.27)
DEPRECATED RDW RBC AUTO: 47.1 FL (ref 37–54)
EGFRCR SERPLBLD CKD-EPI 2021: 85.6 ML/MIN/1.73
EOSINOPHIL # BLD AUTO: 0.61 10*3/MM3 (ref 0–0.4)
EOSINOPHIL NFR BLD AUTO: 6.3 % (ref 0.3–6.2)
ERYTHROCYTE [DISTWIDTH] IN BLOOD BY AUTOMATED COUNT: 14.9 % (ref 12.3–15.4)
ETHANOL BLD-MCNC: <10 MG/DL (ref 0–10)
ETHANOL UR QL: <0.01 %
GLOBULIN UR ELPH-MCNC: 3.4 GM/DL
GLUCOSE SERPL-MCNC: 123 MG/DL (ref 65–99)
GLUCOSE UR STRIP-MCNC: NEGATIVE MG/DL
HCT VFR BLD AUTO: 40.6 % (ref 37.5–51)
HGB BLD-MCNC: 12.8 G/DL (ref 13–17.7)
HGB UR QL STRIP.AUTO: NEGATIVE
HOLD SPECIMEN: NORMAL
IMM GRANULOCYTES # BLD AUTO: 0.03 10*3/MM3 (ref 0–0.05)
IMM GRANULOCYTES NFR BLD AUTO: 0.3 % (ref 0–0.5)
KETONES UR QL STRIP: NEGATIVE
LEUKOCYTE ESTERASE UR QL STRIP.AUTO: NEGATIVE
LIPASE SERPL-CCNC: 33 U/L (ref 13–60)
LYMPHOCYTES # BLD AUTO: 2.5 10*3/MM3 (ref 0.7–3.1)
LYMPHOCYTES NFR BLD AUTO: 25.9 % (ref 19.6–45.3)
MCH RBC QN AUTO: 27.1 PG (ref 26.6–33)
MCHC RBC AUTO-ENTMCNC: 31.5 G/DL (ref 31.5–35.7)
MCV RBC AUTO: 86 FL (ref 79–97)
MONOCYTES # BLD AUTO: 0.47 10*3/MM3 (ref 0.1–0.9)
MONOCYTES NFR BLD AUTO: 4.9 % (ref 5–12)
NEUTROPHILS NFR BLD AUTO: 6.02 10*3/MM3 (ref 1.7–7)
NEUTROPHILS NFR BLD AUTO: 62.2 % (ref 42.7–76)
NITRITE UR QL STRIP: NEGATIVE
NRBC BLD AUTO-RTO: 0 /100 WBC (ref 0–0.2)
PH UR STRIP.AUTO: 5.5 [PH] (ref 5–8)
PLATELET # BLD AUTO: 411 10*3/MM3 (ref 140–450)
PMV BLD AUTO: 9.5 FL (ref 6–12)
POTASSIUM SERPL-SCNC: 4.6 MMOL/L (ref 3.5–5.2)
PROT SERPL-MCNC: 7.5 G/DL (ref 6–8.5)
PROT UR QL STRIP: NEGATIVE
RBC # BLD AUTO: 4.72 10*6/MM3 (ref 4.14–5.8)
SODIUM SERPL-SCNC: 139 MMOL/L (ref 136–145)
SP GR UR STRIP: 1.02 (ref 1–1.03)
UROBILINOGEN UR QL STRIP: NORMAL
WBC NRBC COR # BLD AUTO: 9.67 10*3/MM3 (ref 3.4–10.8)
WHOLE BLOOD HOLD COAG: NORMAL
WHOLE BLOOD HOLD SPECIMEN: NORMAL

## 2025-05-31 PROCEDURE — 82077 ASSAY SPEC XCP UR&BREATH IA: CPT | Performed by: EMERGENCY MEDICINE

## 2025-05-31 PROCEDURE — 25510000001 IOPAMIDOL PER 1 ML: Performed by: EMERGENCY MEDICINE

## 2025-05-31 PROCEDURE — 74177 CT ABD & PELVIS W/CONTRAST: CPT

## 2025-05-31 PROCEDURE — 25810000003 SODIUM CHLORIDE 0.9 % SOLUTION: Performed by: EMERGENCY MEDICINE

## 2025-05-31 PROCEDURE — 96376 TX/PRO/DX INJ SAME DRUG ADON: CPT

## 2025-05-31 PROCEDURE — 83690 ASSAY OF LIPASE: CPT | Performed by: EMERGENCY MEDICINE

## 2025-05-31 PROCEDURE — 85025 COMPLETE CBC W/AUTO DIFF WBC: CPT | Performed by: EMERGENCY MEDICINE

## 2025-05-31 PROCEDURE — 25010000002 MORPHINE PER 10 MG: Performed by: EMERGENCY MEDICINE

## 2025-05-31 PROCEDURE — 80053 COMPREHEN METABOLIC PANEL: CPT | Performed by: EMERGENCY MEDICINE

## 2025-05-31 PROCEDURE — 81003 URINALYSIS AUTO W/O SCOPE: CPT | Performed by: EMERGENCY MEDICINE

## 2025-05-31 PROCEDURE — 25010000002 HYDROMORPHONE 1 MG/ML SOLUTION: Performed by: EMERGENCY MEDICINE

## 2025-05-31 PROCEDURE — 96375 TX/PRO/DX INJ NEW DRUG ADDON: CPT

## 2025-05-31 PROCEDURE — 99285 EMERGENCY DEPT VISIT HI MDM: CPT

## 2025-05-31 PROCEDURE — 25010000002 ONDANSETRON PER 1 MG: Performed by: EMERGENCY MEDICINE

## 2025-05-31 PROCEDURE — 96374 THER/PROPH/DIAG INJ IV PUSH: CPT

## 2025-05-31 RX ORDER — ONDANSETRON 2 MG/ML
4 INJECTION INTRAMUSCULAR; INTRAVENOUS ONCE
Status: COMPLETED | OUTPATIENT
Start: 2025-05-31 | End: 2025-05-31

## 2025-05-31 RX ORDER — PANTOPRAZOLE SODIUM 40 MG/1
40 TABLET, DELAYED RELEASE ORAL DAILY
Qty: 30 TABLET | Refills: 0 | Status: SHIPPED | OUTPATIENT
Start: 2025-05-31

## 2025-05-31 RX ORDER — IOPAMIDOL 755 MG/ML
100 INJECTION, SOLUTION INTRAVASCULAR
Status: COMPLETED | OUTPATIENT
Start: 2025-05-31 | End: 2025-05-31

## 2025-05-31 RX ORDER — PANTOPRAZOLE SODIUM 40 MG/10ML
40 INJECTION, POWDER, LYOPHILIZED, FOR SOLUTION INTRAVENOUS ONCE
Status: COMPLETED | OUTPATIENT
Start: 2025-05-31 | End: 2025-05-31

## 2025-05-31 RX ORDER — SODIUM CHLORIDE 0.9 % (FLUSH) 0.9 %
10 SYRINGE (ML) INJECTION AS NEEDED
Status: DISCONTINUED | OUTPATIENT
Start: 2025-05-31 | End: 2025-05-31 | Stop reason: HOSPADM

## 2025-05-31 RX ADMIN — SODIUM CHLORIDE 1000 ML: 9 INJECTION, SOLUTION INTRAVENOUS at 08:33

## 2025-05-31 RX ADMIN — HYDROMORPHONE HYDROCHLORIDE 1 MG: 1 INJECTION, SOLUTION INTRAMUSCULAR; INTRAVENOUS; SUBCUTANEOUS at 10:20

## 2025-05-31 RX ADMIN — HYDROMORPHONE HYDROCHLORIDE 1 MG: 1 INJECTION, SOLUTION INTRAMUSCULAR; INTRAVENOUS; SUBCUTANEOUS at 09:21

## 2025-05-31 RX ADMIN — PANTOPRAZOLE SODIUM 40 MG: 40 INJECTION, POWDER, FOR SOLUTION INTRAVENOUS at 10:20

## 2025-05-31 RX ADMIN — ONDANSETRON 4 MG: 2 INJECTION INTRAMUSCULAR; INTRAVENOUS at 08:33

## 2025-05-31 RX ADMIN — MORPHINE SULFATE 4 MG: 4 INJECTION, SOLUTION INTRAMUSCULAR; INTRAVENOUS at 08:33

## 2025-05-31 RX ADMIN — IOPAMIDOL 100 ML: 755 INJECTION, SOLUTION INTRAVENOUS at 10:32

## 2025-05-31 NOTE — ED PROVIDER NOTES
"Time: 7:53 AM EDT  Date of encounter:  5/31/2025  Independent Historian/Clinical History and Information was obtained by:   Patient and EMS    History is limited by: N/A    Chief Complaint   Patient presents with    Abdominal Pain     Pt arrives to ED for LLQ pain x 1 week. Pt has N/V/D on and of. Pt is supposed to take meds but stopped          History of Present Illness:  Patient is a 40 y.o. year old male who presents to the emergency department for evaluation of LLQ abd pain with N/V/D that began x 1 week ago and worsened last night. He reports similar episodes in the past but stopped taking his medications (Creon) because he thought he \"was cured\".     Patient Care Team  Primary Care Provider: Provider, No Known    Past Medical History:     Allergies   Allergen Reactions    Bupropion Anaphylaxis    Latex Itching    Penicillins Unknown - High Severity     Past Medical History:   Diagnosis Date    Colitis     Pancreatitis, chronic      Past Surgical History:   Procedure Laterality Date    BILE DUCT EXPLORATION       History reviewed. No pertinent family history.    Home Medications:  Prior to Admission medications    Medication Sig Start Date End Date Taking? Authorizing Provider   albuterol sulfate  (90 Base) MCG/ACT inhaler Every 4 (Four) Hours.    Viral Stearns MD   clindamycin (CLEOCIN) 150 MG capsule Take 1 capsule by mouth 3 (Three) Times a Day. 5/22/23   Viral Stearns MD   cyclobenzaprine (FLEXERIL) 10 MG tablet Take 1 tablet by mouth 3 (Three) Times a Day As Needed for Muscle Spasms. 5/17/24   Yee Najera APRN   fluticasone (FLONASE) 50 MCG/ACT nasal spray Daily.    ProviderViral MD   gabapentin (NEURONTIN) 600 MG tablet Every 8 (Eight) Hours.    Viral Stearns MD   ketorolac (TORADOL) 10 MG tablet Take 1 tablet by mouth Every 6 (Six) Hours As Needed for Moderate Pain, Mild Pain or Severe Pain. 5/17/24   Yee Najera APRN   lidocaine (LIDODERM) 5 % Place 1 patch on " the skin as directed by provider Daily. Remove after 12 hours. 5/17/24   Yee Najera APRN   METHADONE 50MG/ML ORAL CONC methadone oral take 1  by oral route daily   Active    Viral Stearns MD   ondansetron ODT (ZOFRAN-ODT) 4 MG disintegrating tablet Place 1 tablet on the tongue Every 6 (Six) Hours As Needed for Nausea or Vomiting. 8/21/23   Chemo Cabello DO   oxyCODONE-acetaminophen (PERCOCET)  MG per tablet Every 6 (Six) Hours.    Viral Stearns MD   oxyCODONE-acetaminophen (PERCOCET) 5-325 MG per tablet Take 1 tablet by mouth Every 6 (Six) Hours As Needed for Severe Pain. 8/21/23   Chemo Cabello DO   Pancrelipase, Lip-Prot-Amyl, (Creon) 05261-053480 units capsule delayed-release particles capsule Every 6 (Six) Hours.    Viral Stearns MD   pantoprazole (PROTONIX) 40 MG EC tablet Take 1 tablet by mouth Daily.    Viral Stearns MD   promethazine (PHENERGAN) 25 MG tablet Take 1 tablet by mouth Every 6 (Six) Hours As Needed for Nausea or Vomiting. 8/21/23   Cheom Cabello DO   SUMAtriptan (IMITREX) 50 MG tablet Daily.    Viral Stearns MD        Social History:   Social History     Tobacco Use    Smoking status: Every Day     Current packs/day: 1.00     Types: Cigarettes    Smokeless tobacco: Never   Vaping Use    Vaping status: Never Used   Substance Use Topics    Alcohol use: Not Currently    Drug use: Not Currently         Review of Systems:  Review of Systems   Constitutional:  Negative for chills and fever.   HENT:  Negative for congestion, ear pain and sore throat.    Eyes:  Negative for pain.   Respiratory:  Negative for cough, chest tightness and shortness of breath.    Cardiovascular:  Negative for chest pain.   Gastrointestinal:  Positive for abdominal pain, diarrhea, nausea and vomiting.   Genitourinary:  Negative for flank pain and hematuria.   Musculoskeletal:  Negative for joint swelling.   Skin:  Negative for pallor.   Neurological:  Negative for seizures and  "headaches.   All other systems reviewed and are negative.       Physical Exam:  BP 99/60 (BP Location: Right arm, Patient Position: Sitting)   Pulse 57   Temp 97.8 °F (36.6 °C) (Oral)   Resp 17   Ht 182.9 cm (72\")   Wt 73.9 kg (162 lb 14.7 oz)   SpO2 98%   BMI 22.10 kg/m²         Physical Exam  Vitals and nursing note reviewed.   Constitutional:       General: He is not in acute distress.     Appearance: Normal appearance. He is not toxic-appearing.   HENT:      Head: Normocephalic and atraumatic.      Mouth/Throat:      Mouth: Mucous membranes are moist.   Eyes:      General: No scleral icterus.  Cardiovascular:      Rate and Rhythm: Normal rate and regular rhythm.      Pulses: Normal pulses.      Heart sounds: Normal heart sounds.   Pulmonary:      Effort: Pulmonary effort is normal. No respiratory distress.      Breath sounds: Normal breath sounds.   Abdominal:      General: Abdomen is flat.      Palpations: Abdomen is soft.      Tenderness: There is no abdominal tenderness.   Musculoskeletal:         General: Normal range of motion.      Cervical back: Normal range of motion and neck supple.   Skin:     General: Skin is warm and dry.   Neurological:      Mental Status: He is alert and oriented to person, place, and time. Mental status is at baseline.                            Medical Decision Making:      Comorbidities that affect care:    pancreatitis    External Notes reviewed:    None      The following orders were placed and all results were independently analyzed by me:  Orders Placed This Encounter   Procedures    CT Abdomen Pelvis With Contrast    [place] Draw    Comprehensive Metabolic Panel    Lipase    Urinalysis With Microscopic If Indicated (No Culture) - Urine, Clean Catch    CBC Auto Differential    Ethanol    CBC & Differential    Green Top (Gel)    Lavender Top    Gold Top - SST    Light Blue Top    Extra Tubes    Gray Top       Medications Given in the Emergency Department:  Medications "   sodium chloride 0.9 % bolus 1,000 mL (0 mL Intravenous Stopped 5/31/25 1020)   ondansetron (ZOFRAN) injection 4 mg (4 mg Intravenous Given 5/31/25 0833)   morphine injection 4 mg (4 mg Intravenous Given 5/31/25 0833)   HYDROmorphone (DILAUDID) injection 1 mg (1 mg Intravenous Given 5/31/25 0921)   pantoprazole (PROTONIX) injection 40 mg (40 mg Intravenous Given 5/31/25 1020)   HYDROmorphone (DILAUDID) injection 1 mg (1 mg Intravenous Given 5/31/25 1020)   iopamidol (ISOVUE-370) 76 % injection 100 mL (100 mL Intravenous Given 5/31/25 1032)        ED Course:    The patient was initially evaluated in the triage area where orders were placed. The patient was later dispositioned by RONALD Mcintosh.      The patient was advised to stay for completion of workup which includes but is not limited to communication of labs and radiological results, reassessment and plan. The patient was advised that leaving prior to disposition by a provider could result in critical findings that are not communicated to the patient.          Labs:    Lab Results (last 24 hours)       Procedure Component Value Units Date/Time    CBC & Differential [[id number]]  (Abnormal) Collected: 05/31/25 0815    Specimen: Blood from Arm, Right Updated: 05/31/25 0829    Narrative:      The following orders were created for panel order CBC & Differential.  Procedure                               Abnormality         Status                     ---------                               -----------         ------                     CBC Auto Differential[[id number]]        Abnormal            Final result                 Please view results for these tests on the individual orders.    Comprehensive Metabolic Panel [[id number]]  (Abnormal) Collected: 05/31/25 0815    Specimen: Blood from Arm, Right Updated: 05/31/25 0853     Glucose 123 mg/dL      BUN 20.8 mg/dL      Creatinine 1.11 mg/dL      Sodium 139 mmol/L      Potassium 4.6 mmol/L      Chloride 105 mmol/L       CO2 22.4 mmol/L      Calcium 8.7 mg/dL      Total Protein 7.5 g/dL      Albumin 4.1 g/dL      ALT (SGPT) 8 U/L      AST (SGOT) 13 U/L      Alkaline Phosphatase 85 U/L      Total Bilirubin 0.3 mg/dL      Globulin 3.4 gm/dL      A/G Ratio 1.2 g/dL      BUN/Creatinine Ratio 18.7     Anion Gap 11.6 mmol/L      eGFR 85.6 mL/min/1.73     Narrative:      GFR Categories in Chronic Kidney Disease (CKD)              GFR Category          GFR (mL/min/1.73)    Interpretation  G1                    90 or greater        Normal or high (1)  G2                    60-89                Mild decrease (1)  G3a                   45-59                Mild to moderate decrease  G3b                   30-44                Moderate to severe decrease  G4                    15-29                Severe decrease  G5                    14 or less           Kidney failure    (1)In the absence of evidence of kidney disease, neither GFR category G1 or G2 fulfill the criteria for CKD.    eGFR calculation 2021 CKD-EPI creatinine equation, which does not include race as a factor    Lipase [[id number]]  (Normal) Collected: 05/31/25 0815    Specimen: Blood from Arm, Right Updated: 05/31/25 0853     Lipase 33 U/L     CBC Auto Differential [[id number]]  (Abnormal) Collected: 05/31/25 0815    Specimen: Blood from Arm, Right Updated: 05/31/25 0829     WBC 9.67 10*3/mm3      RBC 4.72 10*6/mm3      Hemoglobin 12.8 g/dL      Hematocrit 40.6 %      MCV 86.0 fL      MCH 27.1 pg      MCHC 31.5 g/dL      RDW 14.9 %      RDW-SD 47.1 fl      MPV 9.5 fL      Platelets 411 10*3/mm3      Neutrophil % 62.2 %      Lymphocyte % 25.9 %      Monocyte % 4.9 %      Eosinophil % 6.3 %      Basophil % 0.4 %      Immature Grans % 0.3 %      Neutrophils, Absolute 6.02 10*3/mm3      Lymphocytes, Absolute 2.50 10*3/mm3      Monocytes, Absolute 0.47 10*3/mm3      Eosinophils, Absolute 0.61 10*3/mm3      Basophils, Absolute 0.04 10*3/mm3      Immature Grans, Absolute 0.03 10*3/mm3       nRBC 0.0 /100 WBC     Ethanol [[id number]] Collected: 05/31/25 0815    Specimen: Blood from Arm, Right Updated: 05/31/25 0853     Ethanol <10 mg/dL      Ethanol % <0.010 %     Narrative:      Ethanol (Plasma)  <10 Essentially Negative    Toxic Concentrations           mg/dL    Flushing, slowing of reflexes    Impaired visual activity         Depression of CNS              >100  Possible Coma                  >300       Urinalysis With Microscopic If Indicated (No Culture) - Urine, Clean Catch [[id number]]  (Normal) Collected: 05/31/25 0817    Specimen: Urine, Clean Catch Updated: 05/31/25 0832     Color, UA Yellow     Appearance, UA Clear     pH, UA 5.5     Specific Gravity, UA 1.020     Glucose, UA Negative     Ketones, UA Negative     Bilirubin, UA Negative     Blood, UA Negative     Protein, UA Negative     Leuk Esterase, UA Negative     Nitrite, UA Negative     Urobilinogen, UA 0.2 E.U./dL    Narrative:      Urine microscopic not indicated.             Imaging:    CT Abdomen Pelvis With Contrast  Result Date: 5/31/2025  CT ABDOMEN PELVIS W CONTRAST Date of Exam: 5/31/2025 9:59 AM EDT Indication: mid Abd pn, N/V Comparison: CT AP 8/21/2023 Technique: Axial CT images were obtained of the abdomen and pelvis after the uneventful intravenous administration of iodinated contrast. Reconstructed coronal and sagittal images were also obtained. Automated exposure control and iterative construction methods were used. Findings: The lung bases are clear. The liver is of normal size. The liver is of diffusely diminished density consistent with mild fatty infiltration. The gallbladder is not abnormally distended. No pancreatic or adrenal mass is evident. The spleen is of normal size. The kidneys enhance bilaterally. No renal or ureteral stones are seen. There is no evidence of hydronephrosis. The urinary bladder is not abnormally distended. The prostate gland measures 4.2 cm in greatest transverse dimension. The  stomach is not abnormally distended. Small bowel loops are of normal caliber. The appendix is normal. A small amount of stool is seen in the colon. Bony structures appear intact.     Impression: CT scan of the abdomen and pelvis with IV contrast demonstrating fatty liver. Electronically Signed: Robin Samano MD  5/31/2025 10:45 AM EDT  Workstation ID: [id number]        Differential Diagnosis and Discussion:      Abdominal Pain: Based on the patient's signs and symptoms, I considered abdominal aortic aneurysm, small bowel obstruction, pancreatitis, acute cholecystitis, acute appendecitis, peptic ulcer disease, gastritis, colitis, endocrine disorders, irritable bowel syndrome and other differential diagnosis an etiology of the patient's abdominal pain.  Vomiting: Differential diagnosis includes but is not limited to migraine, labyrinthine disorders, psychogenic, metabolic and endocrine causes, peptic ulcer, gastric outlet obstruction, gastritis, gastroenteritis, appendicitis, intestinal obstruction, paralytic ileus, food poisoning, cholecystitis, acute hepatitis, acute pancreatitis, acute febrile illness, and myocardial infarction.    PROCEDURES:    Labs were collected in the emergency department and all labs were reviewed and interpreted by me.  CT scan was performed in the emergency department and the CT scan radiology impression was interpreted by me.    No orders to display        Procedures    MDM     Amount and/or Complexity of Data Reviewed  Clinical lab tests: reviewed  Tests in the radiology section of CPT®: reviewed                     Patient Care Considerations:    ANTIBIOTICS: I considered prescribing antibiotics as an outpatient however no bacterial focus of infection was found.      Consultants/Shared Management Plan:    None    Social Determinants of Health:    Patient is independent, reliable, and has access to care.       Disposition and Care Coordination:    Discharged: I considered escalation of  care by admitting this patient to the hospital, however pt reports abdominal pain and N/V have subsided.     I have explained the patient´s condition, diagnoses and treatment plan based on the information available to me at this time. I have answered questions and addressed any concerns. The patient has a good  understanding of the patient´s diagnosis, condition, and treatment plan as can be expected at this point. The vital signs have been stable. The patient´s condition is stable and appropriate for discharge from the emergency department.      The patient will pursue further outpatient evaluation with the primary care physician or other designated or consulting physician as outlined in the discharge instructions. They are agreeable to this plan of care and follow-up instructions have been explained in detail. The patient has received these instructions in written format and has expressed an understanding of the discharge instructions. The patient is aware that any significant change in condition or worsening of symptoms should prompt an immediate return to this or the closest emergency department or call to 911.    Final diagnoses:   Bilious vomiting with nausea   Generalized abdominal pain        ED Disposition       ED Disposition   Discharge    Condition   Stable    Comment   --               This medical record created using voice recognition software.             Leon Lizarraga, APRN  05/31/25 1225

## 2025-06-09 NOTE — ED PROVIDER NOTES
"SHARED VISIT ATTESTATION:    This visit was performed by myself and an APC.  I personally approved the management plan/medical decision making and take responsibility for the patient management.      SHARED VISIT NOTE:    Patient is 40 y.o. year old male that presents to the ED for evaluation of left lower quadrant abdominal pain.  Additionally patient reports having nausea, vomiting, and diarrhea.     Physical Exam    ED Course:    BP 99/60 (BP Location: Right arm, Patient Position: Sitting)   Pulse 57   Temp 97.8 °F (36.6 °C) (Oral)   Resp 17   Ht 182.9 cm (72\")   Wt 73.9 kg (162 lb 14.7 oz)   SpO2 98%   BMI 22.10 kg/m²       The following orders were placed and all results were independently analyzed by me:  Orders Placed This Encounter   Procedures    CT Abdomen Pelvis With Contrast    [place] Draw    Comprehensive Metabolic Panel    Lipase    Urinalysis With Microscopic If Indicated (No Culture) - Urine, Clean Catch    CBC Auto Differential    Ethanol    CBC & Differential    Green Top (Gel)    Lavender Top    Gold Top - SST    Light Blue Top    Extra Tubes    Gray Top       Medications Given in the Emergency Department:  Medications   sodium chloride 0.9 % bolus 1,000 mL (0 mL Intravenous Stopped 5/31/25 1020)   ondansetron (ZOFRAN) injection 4 mg (4 mg Intravenous Given 5/31/25 0833)   morphine injection 4 mg (4 mg Intravenous Given 5/31/25 0833)   HYDROmorphone (DILAUDID) injection 1 mg (1 mg Intravenous Given 5/31/25 0921)   pantoprazole (PROTONIX) injection 40 mg (40 mg Intravenous Given 5/31/25 1020)   HYDROmorphone (DILAUDID) injection 1 mg (1 mg Intravenous Given 5/31/25 1020)   iopamidol (ISOVUE-370) 76 % injection 100 mL (100 mL Intravenous Given 5/31/25 1032)        ED Course:         Labs:    Lab Results (last 24 hours)       ** No results found for the last 24 hours. **             Imaging:    No Radiology Exams Resulted Within Past 24 Hours    MDM:    Procedures    Labs were collected in " the emergency department and all labs were reviewed and interpreted by me.  CT scan was performed in the emergency department and the CT scan radiology impression was interpreted by me.                     Epi Alas DO  23:14 EDT  06/08/25         Epi Alas DO  06/08/25 1151

## 2025-07-02 ENCOUNTER — HOSPITAL ENCOUNTER (EMERGENCY)
Facility: HOSPITAL | Age: 40
Discharge: HOME | End: 2025-07-02
Attending: EMERGENCY MEDICINE | Admitting: EMERGENCY MEDICINE
Payer: MEDICARE

## 2025-07-02 ENCOUNTER — APPOINTMENT (OUTPATIENT)
Dept: CT IMAGING | Facility: HOSPITAL | Age: 40
End: 2025-07-02
Payer: MEDICARE

## 2025-07-02 VITALS
SYSTOLIC BLOOD PRESSURE: 128 MMHG | DIASTOLIC BLOOD PRESSURE: 93 MMHG | BODY MASS INDEX: 22.4 KG/M2 | RESPIRATION RATE: 18 BRPM | HEART RATE: 71 BPM | WEIGHT: 165.34 LBS | HEIGHT: 72 IN | TEMPERATURE: 98.1 F | OXYGEN SATURATION: 98 %

## 2025-07-02 DIAGNOSIS — R10.12 LEFT UPPER QUADRANT ABDOMINAL PAIN: Primary | ICD-10-CM

## 2025-07-02 LAB
ALBUMIN SERPL-MCNC: 4.3 G/DL (ref 3.5–5.2)
ALBUMIN/GLOB SERPL: 1.4 G/DL
ALP SERPL-CCNC: 100 U/L (ref 39–117)
ALT SERPL W P-5'-P-CCNC: 8 U/L (ref 1–41)
ANION GAP SERPL CALCULATED.3IONS-SCNC: 9.4 MMOL/L (ref 5–15)
AST SERPL-CCNC: 11 U/L (ref 1–40)
BASOPHILS # BLD AUTO: 0.05 10*3/MM3 (ref 0–0.2)
BASOPHILS NFR BLD AUTO: 0.6 % (ref 0–1.5)
BILIRUB SERPL-MCNC: 0.4 MG/DL (ref 0–1.2)
BILIRUB UR QL STRIP: NEGATIVE
BUN SERPL-MCNC: 9.8 MG/DL (ref 6–20)
BUN/CREAT SERPL: 10.5 (ref 7–25)
CALCIUM SPEC-SCNC: 8.9 MG/DL (ref 8.6–10.5)
CHLORIDE SERPL-SCNC: 104 MMOL/L (ref 98–107)
CLARITY UR: CLEAR
CO2 SERPL-SCNC: 22.6 MMOL/L (ref 22–29)
COLOR UR: YELLOW
CREAT SERPL-MCNC: 0.93 MG/DL (ref 0.76–1.27)
DEPRECATED RDW RBC AUTO: 46.5 FL (ref 37–54)
EGFRCR SERPLBLD CKD-EPI 2021: 105.8 ML/MIN/1.73
EOSINOPHIL # BLD AUTO: 0.29 10*3/MM3 (ref 0–0.4)
EOSINOPHIL NFR BLD AUTO: 3.5 % (ref 0.3–6.2)
ERYTHROCYTE [DISTWIDTH] IN BLOOD BY AUTOMATED COUNT: 15.2 % (ref 12.3–15.4)
GLOBULIN UR ELPH-MCNC: 3 GM/DL
GLUCOSE SERPL-MCNC: 112 MG/DL (ref 65–99)
GLUCOSE UR STRIP-MCNC: NEGATIVE MG/DL
HCT VFR BLD AUTO: 39.8 % (ref 37.5–51)
HGB BLD-MCNC: 12.8 G/DL (ref 13–17.7)
HGB UR QL STRIP.AUTO: NEGATIVE
HOLD SPECIMEN: NORMAL
IMM GRANULOCYTES # BLD AUTO: 0.01 10*3/MM3 (ref 0–0.05)
IMM GRANULOCYTES NFR BLD AUTO: 0.1 % (ref 0–0.5)
KETONES UR QL STRIP: NEGATIVE
LEUKOCYTE ESTERASE UR QL STRIP.AUTO: NEGATIVE
LIPASE SERPL-CCNC: 31 U/L (ref 13–60)
LYMPHOCYTES # BLD AUTO: 1.75 10*3/MM3 (ref 0.7–3.1)
LYMPHOCYTES NFR BLD AUTO: 21.1 % (ref 19.6–45.3)
MCH RBC QN AUTO: 27.1 PG (ref 26.6–33)
MCHC RBC AUTO-ENTMCNC: 32.2 G/DL (ref 31.5–35.7)
MCV RBC AUTO: 84.1 FL (ref 79–97)
MONOCYTES # BLD AUTO: 0.35 10*3/MM3 (ref 0.1–0.9)
MONOCYTES NFR BLD AUTO: 4.2 % (ref 5–12)
NEUTROPHILS NFR BLD AUTO: 5.83 10*3/MM3 (ref 1.7–7)
NEUTROPHILS NFR BLD AUTO: 70.5 % (ref 42.7–76)
NITRITE UR QL STRIP: NEGATIVE
NRBC BLD AUTO-RTO: 0 /100 WBC (ref 0–0.2)
PH UR STRIP.AUTO: 6 [PH] (ref 5–8)
PLATELET # BLD AUTO: 398 10*3/MM3 (ref 140–450)
PMV BLD AUTO: 10 FL (ref 6–12)
POTASSIUM SERPL-SCNC: 4.4 MMOL/L (ref 3.5–5.2)
PROT SERPL-MCNC: 7.3 G/DL (ref 6–8.5)
PROT UR QL STRIP: NEGATIVE
RBC # BLD AUTO: 4.73 10*6/MM3 (ref 4.14–5.8)
SODIUM SERPL-SCNC: 136 MMOL/L (ref 136–145)
SP GR UR STRIP: 1.01 (ref 1–1.03)
UROBILINOGEN UR QL STRIP: NORMAL
WBC NRBC COR # BLD AUTO: 8.28 10*3/MM3 (ref 3.4–10.8)
WHOLE BLOOD HOLD COAG: NORMAL
WHOLE BLOOD HOLD SPECIMEN: NORMAL

## 2025-07-02 PROCEDURE — 74177 CT ABD & PELVIS W/CONTRAST: CPT

## 2025-07-02 PROCEDURE — 81003 URINALYSIS AUTO W/O SCOPE: CPT

## 2025-07-02 PROCEDURE — 25010000002 KETOROLAC TROMETHAMINE PER 15 MG

## 2025-07-02 PROCEDURE — 96374 THER/PROPH/DIAG INJ IV PUSH: CPT

## 2025-07-02 PROCEDURE — 96375 TX/PRO/DX INJ NEW DRUG ADDON: CPT

## 2025-07-02 PROCEDURE — 80053 COMPREHEN METABOLIC PANEL: CPT | Performed by: EMERGENCY MEDICINE

## 2025-07-02 PROCEDURE — 99285 EMERGENCY DEPT VISIT HI MDM: CPT

## 2025-07-02 PROCEDURE — 85025 COMPLETE CBC W/AUTO DIFF WBC: CPT

## 2025-07-02 PROCEDURE — 83690 ASSAY OF LIPASE: CPT | Performed by: EMERGENCY MEDICINE

## 2025-07-02 PROCEDURE — 36415 COLL VENOUS BLD VENIPUNCTURE: CPT

## 2025-07-02 PROCEDURE — 25510000001 IOPAMIDOL PER 1 ML: Performed by: EMERGENCY MEDICINE

## 2025-07-02 PROCEDURE — 25010000002 ONDANSETRON PER 1 MG

## 2025-07-02 RX ORDER — IOPAMIDOL 755 MG/ML
100 INJECTION, SOLUTION INTRAVASCULAR
Status: COMPLETED | OUTPATIENT
Start: 2025-07-02 | End: 2025-07-02

## 2025-07-02 RX ORDER — SODIUM CHLORIDE 0.9 % (FLUSH) 0.9 %
10 SYRINGE (ML) INJECTION AS NEEDED
Status: DISCONTINUED | OUTPATIENT
Start: 2025-07-02 | End: 2025-07-02 | Stop reason: HOSPADM

## 2025-07-02 RX ORDER — KETOROLAC TROMETHAMINE 30 MG/ML
30 INJECTION, SOLUTION INTRAMUSCULAR; INTRAVENOUS ONCE
Status: COMPLETED | OUTPATIENT
Start: 2025-07-02 | End: 2025-07-02

## 2025-07-02 RX ORDER — ONDANSETRON 2 MG/ML
4 INJECTION INTRAMUSCULAR; INTRAVENOUS ONCE
Status: COMPLETED | OUTPATIENT
Start: 2025-07-02 | End: 2025-07-02

## 2025-07-02 RX ADMIN — IOPAMIDOL 100 ML: 755 INJECTION, SOLUTION INTRAVENOUS at 09:58

## 2025-07-02 RX ADMIN — KETOROLAC TROMETHAMINE 30 MG: 30 INJECTION, SOLUTION INTRAMUSCULAR; INTRAVENOUS at 10:55

## 2025-07-02 RX ADMIN — ONDANSETRON 4 MG: 2 INJECTION, SOLUTION INTRAMUSCULAR; INTRAVENOUS at 10:53

## 2025-07-02 NOTE — DISCHARGE INSTRUCTIONS
Please note that your lab work, as well as your CT, was all within acceptable limits.  There was no signs of an acute pancreatitis.  Please continue to take all your regular medicines as normal.  Return to the ER if you have worsening of symptoms, severe nausea, vomiting, diarrhea, or fever that you cannot control with Tylenol or Motrin.  Otherwise follow-up with your PCP in 3 to 5 days

## 2025-07-02 NOTE — ED PROVIDER NOTES
Time: 8:37 AM EDT  Date of encounter:  7/2/2025  Room: 22/22    Independent Historian/Clinical History and Information was obtained by:   Patient    History is limited by: N/A    Chief Complaint: ab pain       History of Present Illness:  Patient is a 40 y.o. year old male who presents to the emergency department for evaluation of abdominal pain.  Patient states he has had abdominal pain daily for approximately 1 month.  He has also noted some vomiting approximately every other day that is associated with his pain.  He has a history of pancreatitis.  He denies any diarrhea but does state he has had mild constipation.  Pain is described to be left upper quadrant and radiating around to his left flank.       Patient Care Team  Primary Care Provider: Provider, No Known    Past Medical History:     Allergies   Allergen Reactions    Bupropion Anaphylaxis    Latex Itching    Penicillins Unknown - High Severity     Past Medical History:   Diagnosis Date    Bowel obstruction     Colitis     Pancreatitis, chronic      Past Surgical History:   Procedure Laterality Date    BILE DUCT EXPLORATION       History reviewed. No pertinent family history.    Home Medications:  Prior to Admission medications    Medication Sig Start Date End Date Taking? Authorizing Provider   albuterol sulfate  (90 Base) MCG/ACT inhaler Every 4 (Four) Hours.    ProviderViral MD   clindamycin (CLEOCIN) 150 MG capsule Take 1 capsule by mouth 3 (Three) Times a Day. 5/22/23   Viral Stearns MD   cyclobenzaprine (FLEXERIL) 10 MG tablet Take 1 tablet by mouth 3 (Three) Times a Day As Needed for Muscle Spasms. 5/17/24   Yee Najera APRN   fluticasone (FLONASE) 50 MCG/ACT nasal spray Daily.    ProviderViral MD   gabapentin (NEURONTIN) 600 MG tablet Every 8 (Eight) Hours.    Viral Stearns MD   ketorolac (TORADOL) 10 MG tablet Take 1 tablet by mouth Every 6 (Six) Hours As Needed for Moderate Pain, Mild Pain or Severe Pain.  "5/17/24   Yee Najera APRN   lidocaine (LIDODERM) 5 % Place 1 patch on the skin as directed by provider Daily. Remove after 12 hours. 5/17/24   Yee Najera APRN   METHADONE 50MG/ML ORAL CONC methadone oral take 1  by oral route daily   Active    ProviderViral MD   ondansetron ODT (ZOFRAN-ODT) 4 MG disintegrating tablet Place 1 tablet on the tongue Every 6 (Six) Hours As Needed for Nausea or Vomiting. 8/21/23   Chemo Cabello DO   oxyCODONE-acetaminophen (PERCOCET)  MG per tablet Every 6 (Six) Hours.    Viral Stearns MD   oxyCODONE-acetaminophen (PERCOCET) 5-325 MG per tablet Take 1 tablet by mouth Every 6 (Six) Hours As Needed for Severe Pain. 8/21/23   Chemo Cabello DO   Pancrelipase, Lip-Prot-Amyl, (Creon) 64318-018639 units capsule delayed-release particles capsule Every 6 (Six) Hours.    ProviderViral MD   pantoprazole (PROTONIX) 40 MG EC tablet Take 1 tablet by mouth Daily. 5/31/25   Leon Lizarraga APRN   promethazine (PHENERGAN) 25 MG tablet Take 1 tablet by mouth Every 6 (Six) Hours As Needed for Nausea or Vomiting. 8/21/23   Chemo Cabello DO   SUMAtriptan (IMITREX) 50 MG tablet Daily.    ProviderViral MD        Social History:   Social History     Tobacco Use    Smoking status: Every Day     Current packs/day: 1.00     Types: Cigarettes    Smokeless tobacco: Never   Vaping Use    Vaping status: Never Used   Substance Use Topics    Alcohol use: Not Currently    Drug use: Not Currently         Review of Systems:  Review of Systems     I performed a review of systems today, which was all negative, except for the positives found in the HPI above.      Physical Exam:  /93   Pulse 71   Temp 98.1 °F (36.7 °C) (Oral)   Resp 18   Ht 182.9 cm (72\")   Wt 75 kg (165 lb 5.5 oz)   SpO2 98%   BMI 22.42 kg/m²     Physical Exam   General:  Awake alert no apparent distress    Head: Normocephalic, atraumatic, eyes PERRLA EOMI, nose normal, oropharynx normal    Neck: " Supple, no meningismus, no cervical lymphadenopathy or JVD    Heart: Regular rate and rhythm, no murmurs or rubs, 2+ radial pulses    Lungs: Clear to auscultation bilaterally, no wheezing or rhonchi or rales, no respiratory distress    Abdomen: Soft, tender on exam to LUQ nondistended, no signs of peritonitis    Skin: Warm, dry, no rash    Musculoskeletal: Normal range of motion, no obvious deformities, no edema noted    Neurologic: Awake, alert, oriented x 3, no motor or sensory deficits appreciated    Psych: No suicidal or homicidal ideations, no psychosis              Medical Decision Making:      Comorbidities that affect care:    Pancreatitis, colitis, bowel obstruction, smoke    External Notes reviewed:    Previous Radiological Studies: I reviewed patient's last CT that was completed on 5/31/2025.      The following orders were placed and all results were independently analyzed by me:  Orders Placed This Encounter   Procedures    CT Abdomen Pelvis With Contrast    [place] Draw    Comprehensive Metabolic Panel    Lipase    Urinalysis With Microscopic If Indicated (No Culture) - Urine, Clean Catch    CBC Auto Differential    NPO Diet NPO Type: Strict NPO    Undress & Gown    Insert Peripheral IV    CBC & Differential    Green Top (Gel)    Lavender Top    Gold Top - SST    Light Blue Top    Extra Tubes    Gray Top       Medications Given in the Emergency Department:  Medications   sodium chloride 0.9 % flush 10 mL (has no administration in time range)   iopamidol (ISOVUE-370) 76 % injection 100 mL (100 mL Intravenous Given 7/2/25 0958)   ondansetron (ZOFRAN) injection 4 mg (4 mg Intravenous Given 7/2/25 1053)   ketorolac (TORADOL) injection 30 mg (30 mg Intravenous Given 7/2/25 1055)        ED Course:    ED Course as of 07/02/25 1125   Wed Jul 02, 2025   0913 Patient reports his labs have historically been within normal limits in the past when he has been found to have acute pancreatitis on CT. [MS]   1114  Patient was updated with all findings.  He advises that he does have a ride that is underway and he would like to be discharged with pain medicines did provide some relief [MS]      ED Course User Index  [MS] Juwan Mariann Margoth, RONALD       Labs:    Lab Results (last 24 hours)       Procedure Component Value Units Date/Time    CBC & Differential [[id number]]  (Abnormal) Collected: 07/02/25 0750    Specimen: Blood Updated: 07/02/25 0758    Narrative:      The following orders were created for panel order CBC & Differential.  Procedure                               Abnormality         Status                     ---------                               -----------         ------                     CBC Auto Differential[[id number]]        Abnormal            Final result                 Please view results for these tests on the individual orders.    Comprehensive Metabolic Panel [[id number]]  (Abnormal) Collected: 07/02/25 0750    Specimen: Blood Updated: 07/02/25 0825     Glucose 112 mg/dL      BUN 9.8 mg/dL      Creatinine 0.93 mg/dL      Sodium 136 mmol/L      Potassium 4.4 mmol/L      Chloride 104 mmol/L      CO2 22.6 mmol/L      Calcium 8.9 mg/dL      Total Protein 7.3 g/dL      Albumin 4.3 g/dL      ALT (SGPT) 8 U/L      AST (SGOT) 11 U/L      Alkaline Phosphatase 100 U/L      Total Bilirubin 0.4 mg/dL      Globulin 3.0 gm/dL      A/G Ratio 1.4 g/dL      BUN/Creatinine Ratio 10.5     Anion Gap 9.4 mmol/L      eGFR 105.8 mL/min/1.73     Narrative:      GFR Categories in Chronic Kidney Disease (CKD)              GFR Category          GFR (mL/min/1.73)    Interpretation  G1                    90 or greater        Normal or high (1)  G2                    60-89                Mild decrease (1)  G3a                   45-59                Mild to moderate decrease  G3b                   30-44                Moderate to severe decrease  G4                    15-29                Severe decrease  G5                    14  or less           Kidney failure    (1)In the absence of evidence of kidney disease, neither GFR category G1 or G2 fulfill the criteria for CKD.    eGFR calculation 2021 CKD-EPI creatinine equation, which does not include race as a factor    Lipase [[id number]]  (Normal) Collected: 07/02/25 0750    Specimen: Blood Updated: 07/02/25 0825     Lipase 31 U/L     CBC Auto Differential [[id number]]  (Abnormal) Collected: 07/02/25 0750    Specimen: Blood Updated: 07/02/25 0758     WBC 8.28 10*3/mm3      RBC 4.73 10*6/mm3      Hemoglobin 12.8 g/dL      Hematocrit 39.8 %      MCV 84.1 fL      MCH 27.1 pg      MCHC 32.2 g/dL      RDW 15.2 %      RDW-SD 46.5 fl      MPV 10.0 fL      Platelets 398 10*3/mm3      Neutrophil % 70.5 %      Lymphocyte % 21.1 %      Monocyte % 4.2 %      Eosinophil % 3.5 %      Basophil % 0.6 %      Immature Grans % 0.1 %      Neutrophils, Absolute 5.83 10*3/mm3      Lymphocytes, Absolute 1.75 10*3/mm3      Monocytes, Absolute 0.35 10*3/mm3      Eosinophils, Absolute 0.29 10*3/mm3      Basophils, Absolute 0.05 10*3/mm3      Immature Grans, Absolute 0.01 10*3/mm3      nRBC 0.0 /100 WBC     Urinalysis With Microscopic If Indicated (No Culture) - Urine, Clean Catch [[id number]]  (Normal) Collected: 07/02/25 0802    Specimen: Urine, Clean Catch Updated: 07/02/25 0811     Color, UA Yellow     Appearance, UA Clear     pH, UA 6.0     Specific Gravity, UA 1.015     Glucose, UA Negative     Ketones, UA Negative     Bilirubin, UA Negative     Blood, UA Negative     Protein, UA Negative     Leuk Esterase, UA Negative     Nitrite, UA Negative     Urobilinogen, UA 0.2 E.U./dL    Narrative:      Urine microscopic not indicated.             Imaging:    CT Abdomen Pelvis With Contrast  Result Date: 7/2/2025  CT ABDOMEN PELVIS W CONTRAST Date of Exam: 7/2/2025 9:52 AM EDT Indication: left ab pain , vomiting bile, hx pancreatitis. Comparison: CT of the abdomen and pelvis dated 5/31/2025 Technique: Axial CT images were  obtained of the abdomen and pelvis after the uneventful intravenous administration of iodinated contrast. Reconstructed coronal and sagittal images were also obtained. Automated exposure control and iterative construction methods were used. Findings: Liver: The liver is prominent in size. No focal liver lesion is seen. No biliary dilation is seen. Gallbladder: Folded configuration. Otherwise unremarkable. Pancreas: Unremarkable. Spleen: Unremarkable. Adrenal glands: Unremarkable. Genitourinary tract: Kidneys are unremarkable. No hydronephrosis is seen. The visualized portions of the ureters and urinary bladder appear unremarkable. The prostate gland is unremarkable. Gastrointestinal tract: The visualized hollow viscera demonstrate no focal lesion. There is no evidence of bowel obstruction. Appendix: The appendix is not identified. Other findings: No free air or free fluid is identified. No pathologically enlarged lymph nodes are seen. The abdominal aorta and IVC appear unremarkable. Bones and soft tissues: No acute or suspicious osseous or soft tissue lesion is identified. Lung bases: The visualized lung bases are clear.     Impression: 1.No acute abnormality identified within the abdomen or pelvis. 2.Nonemergent findings as detailed above. Electronically Signed: Vu Ott MD  7/2/2025 10:49 AM EDT  Workstation ID: [id number]        Differential Diagnosis and Discussion:    Abdominal Pain: Based on the patient's signs and symptoms, I considered abdominal aortic aneurysm, small bowel obstruction, pancreatitis, acute cholecystitis, acute appendecitis, peptic ulcer disease, gastritis, colitis, endocrine disorders, irritable bowel syndrome and other differential diagnosis an etiology of the patient's abdominal pain.    PROCEDURES:    Labs were collected in the emergency department and all labs were reviewed and interpreted by me.  CT scan was performed in the emergency department and the CT scan radiology impression  was interpreted by me.    No orders to display       Procedures    MDM     Amount and/or Complexity of Data Reviewed  Clinical lab tests: reviewed  Decide to obtain previous medical records or to obtain history from someone other than the patient: yes                       Patient Care Considerations:    SEPSIS was considered but is NOT present in the emergency department as SIRS criteria is not present. ANTIBIOTICS: I considered prescribing antibiotics as an outpatient however no bacterial focus of infection was found.      Consultants/Shared Management Plan:        Social Determinants of Health:          Disposition and Care Coordination:    Discharged: The patient is suitable and stable for discharge with no need for consideration of admission.    I have explained the patient´s condition, diagnoses and treatment plan based on the information available to me at this time. I have answered questions and addressed any concerns. The patient has a good  understanding of the patient´s diagnosis, condition, and treatment plan as can be expected at this point. The vital signs have been stable. The patient´s condition is stable and appropriate for discharge from the emergency department.      The patient will pursue further outpatient evaluation with the primary care physician or other designated or consulting physician as outlined in the discharge instructions. They are agreeable to this plan of care and follow-up instructions have been explained in detail. The patient has received these instructions in written format and has expressed an understanding of the discharge instructions. The patient is aware that any significant change in condition or worsening of symptoms should prompt an immediate return to this or the closest emergency department or call to 911.    Final diagnoses:   Left upper quadrant abdominal pain        ED Disposition       ED Disposition   Discharge    Condition   Stable    Comment   --                This medical record created using voice recognition software.           Mariann Echeverria, APRN  07/02/25 1127